# Patient Record
Sex: FEMALE | Race: BLACK OR AFRICAN AMERICAN | NOT HISPANIC OR LATINO
[De-identification: names, ages, dates, MRNs, and addresses within clinical notes are randomized per-mention and may not be internally consistent; named-entity substitution may affect disease eponyms.]

---

## 2017-04-04 ENCOUNTER — RECORD ABSTRACTING (OUTPATIENT)
Age: 32
End: 2017-04-04

## 2017-04-04 DIAGNOSIS — R87.610 ATYPICAL SQUAMOUS CELLS OF UNDETERMINED SIGNIFICANCE ON CYTOLOGIC SMEAR OF CERVIX (ASC-US): ICD-10-CM

## 2017-04-04 DIAGNOSIS — Z83.3 FAMILY HISTORY OF DIABETES MELLITUS: ICD-10-CM

## 2017-04-04 DIAGNOSIS — Z12.4 ENCOUNTER FOR SCREENING FOR MALIGNANT NEOPLASM OF CERVIX: ICD-10-CM

## 2017-05-10 ENCOUNTER — INPATIENT (INPATIENT)
Facility: HOSPITAL | Age: 32
LOS: 3 days | Discharge: HOME | End: 2017-05-14
Attending: OBSTETRICS & GYNECOLOGY | Admitting: OBSTETRICS & GYNECOLOGY

## 2017-06-28 DIAGNOSIS — O36.63X0 MATERNAL CARE FOR EXCESSIVE FETAL GROWTH, THIRD TRIMESTER, NOT APPLICABLE OR UNSPECIFIED: ICD-10-CM

## 2017-06-28 DIAGNOSIS — Z3A.38 38 WEEKS GESTATION OF PREGNANCY: ICD-10-CM

## 2018-12-28 ENCOUNTER — OUTPATIENT (OUTPATIENT)
Dept: OUTPATIENT SERVICES | Facility: HOSPITAL | Age: 33
LOS: 1 days | Discharge: HOME | End: 2018-12-28

## 2018-12-28 DIAGNOSIS — Z02.1 ENCOUNTER FOR PRE-EMPLOYMENT EXAMINATION: ICD-10-CM

## 2019-11-29 ENCOUNTER — OUTPATIENT (OUTPATIENT)
Dept: OUTPATIENT SERVICES | Facility: HOSPITAL | Age: 34
LOS: 1 days | Discharge: HOME | End: 2019-11-29
Payer: COMMERCIAL

## 2019-11-29 DIAGNOSIS — M54.9 DORSALGIA, UNSPECIFIED: ICD-10-CM

## 2019-11-29 PROCEDURE — 72070 X-RAY EXAM THORAC SPINE 2VWS: CPT | Mod: 26

## 2019-11-29 PROCEDURE — 72100 X-RAY EXAM L-S SPINE 2/3 VWS: CPT | Mod: 26

## 2019-12-19 ENCOUNTER — OUTPATIENT (OUTPATIENT)
Dept: OUTPATIENT SERVICES | Facility: HOSPITAL | Age: 34
LOS: 1 days | Discharge: HOME | End: 2019-12-19

## 2019-12-19 DIAGNOSIS — M54.89 OTHER DORSALGIA: ICD-10-CM

## 2020-06-18 ENCOUNTER — INPATIENT (INPATIENT)
Facility: HOSPITAL | Age: 35
LOS: 1 days | Discharge: HOME | End: 2020-06-20
Attending: INTERNAL MEDICINE | Admitting: INTERNAL MEDICINE
Payer: COMMERCIAL

## 2020-06-18 VITALS
RESPIRATION RATE: 18 BRPM | SYSTOLIC BLOOD PRESSURE: 104 MMHG | TEMPERATURE: 98 F | HEART RATE: 92 BPM | OXYGEN SATURATION: 99 % | DIASTOLIC BLOOD PRESSURE: 55 MMHG

## 2020-06-18 LAB
ALBUMIN SERPL ELPH-MCNC: 4 G/DL — SIGNIFICANT CHANGE UP (ref 3.5–5.2)
ALP SERPL-CCNC: 46 U/L — SIGNIFICANT CHANGE UP (ref 30–115)
ALT FLD-CCNC: 8 U/L — SIGNIFICANT CHANGE UP (ref 0–41)
ANION GAP SERPL CALC-SCNC: 12 MMOL/L — SIGNIFICANT CHANGE UP (ref 7–14)
APPEARANCE UR: CLEAR — SIGNIFICANT CHANGE UP
AST SERPL-CCNC: 12 U/L — SIGNIFICANT CHANGE UP (ref 0–41)
BASE EXCESS BLDV CALC-SCNC: 3.7 MMOL/L — HIGH (ref -2–2)
BASOPHILS # BLD AUTO: 0.03 K/UL — SIGNIFICANT CHANGE UP (ref 0–0.2)
BASOPHILS NFR BLD AUTO: 0.4 % — SIGNIFICANT CHANGE UP (ref 0–1)
BILIRUB SERPL-MCNC: <0.2 MG/DL — SIGNIFICANT CHANGE UP (ref 0.2–1.2)
BILIRUB UR-MCNC: NEGATIVE — SIGNIFICANT CHANGE UP
BLD GP AB SCN SERPL QL: SIGNIFICANT CHANGE UP
BUN SERPL-MCNC: 15 MG/DL — SIGNIFICANT CHANGE UP (ref 10–20)
CA-I SERPL-SCNC: 1.19 MMOL/L — SIGNIFICANT CHANGE UP (ref 1.12–1.3)
CALCIUM SERPL-MCNC: 8.9 MG/DL — SIGNIFICANT CHANGE UP (ref 8.5–10.1)
CHLORIDE SERPL-SCNC: 100 MMOL/L — SIGNIFICANT CHANGE UP (ref 98–110)
CO2 SERPL-SCNC: 27 MMOL/L — SIGNIFICANT CHANGE UP (ref 17–32)
COLOR SPEC: SIGNIFICANT CHANGE UP
CREAT SERPL-MCNC: 0.7 MG/DL — SIGNIFICANT CHANGE UP (ref 0.7–1.5)
DIFF PNL FLD: NEGATIVE — SIGNIFICANT CHANGE UP
EOSINOPHIL # BLD AUTO: 0.17 K/UL — SIGNIFICANT CHANGE UP (ref 0–0.7)
EOSINOPHIL NFR BLD AUTO: 2.5 % — SIGNIFICANT CHANGE UP (ref 0–8)
GAS PNL BLDV: 139 MMOL/L — SIGNIFICANT CHANGE UP (ref 136–145)
GAS PNL BLDV: SIGNIFICANT CHANGE UP
GLUCOSE SERPL-MCNC: 98 MG/DL — SIGNIFICANT CHANGE UP (ref 70–99)
GLUCOSE UR QL: NEGATIVE — SIGNIFICANT CHANGE UP
HCG SERPL QL: NEGATIVE — SIGNIFICANT CHANGE UP
HCO3 BLDV-SCNC: 29 MMOL/L — SIGNIFICANT CHANGE UP (ref 22–29)
HCT VFR BLD CALC: 32 % — LOW (ref 37–47)
HCT VFR BLDA CALC: 33.3 % — LOW (ref 34–44)
HGB BLD CALC-MCNC: 10.9 G/DL — LOW (ref 14–18)
HGB BLD-MCNC: 10.2 G/DL — LOW (ref 12–16)
IMM GRANULOCYTES NFR BLD AUTO: 0.3 % — SIGNIFICANT CHANGE UP (ref 0.1–0.3)
INR BLD: 1.18 RATIO — SIGNIFICANT CHANGE UP (ref 0.65–1.3)
KETONES UR-MCNC: NEGATIVE — SIGNIFICANT CHANGE UP
LACTATE BLDV-MCNC: 0.7 MMOL/L — SIGNIFICANT CHANGE UP (ref 0.5–1.6)
LEUKOCYTE ESTERASE UR-ACNC: NEGATIVE — SIGNIFICANT CHANGE UP
LYMPHOCYTES # BLD AUTO: 2.25 K/UL — SIGNIFICANT CHANGE UP (ref 1.2–3.4)
LYMPHOCYTES # BLD AUTO: 32.5 % — SIGNIFICANT CHANGE UP (ref 20.5–51.1)
MCHC RBC-ENTMCNC: 30.3 PG — SIGNIFICANT CHANGE UP (ref 27–31)
MCHC RBC-ENTMCNC: 31.9 G/DL — LOW (ref 32–37)
MCV RBC AUTO: 95 FL — SIGNIFICANT CHANGE UP (ref 81–99)
MONOCYTES # BLD AUTO: 0.55 K/UL — SIGNIFICANT CHANGE UP (ref 0.1–0.6)
MONOCYTES NFR BLD AUTO: 7.9 % — SIGNIFICANT CHANGE UP (ref 1.7–9.3)
NEUTROPHILS # BLD AUTO: 3.91 K/UL — SIGNIFICANT CHANGE UP (ref 1.4–6.5)
NEUTROPHILS NFR BLD AUTO: 56.4 % — SIGNIFICANT CHANGE UP (ref 42.2–75.2)
NITRITE UR-MCNC: NEGATIVE — SIGNIFICANT CHANGE UP
NRBC # BLD: 0 /100 WBCS — SIGNIFICANT CHANGE UP (ref 0–0)
NT-PROBNP SERPL-SCNC: 24 PG/ML — SIGNIFICANT CHANGE UP (ref 0–300)
PCO2 BLDV: 48 MMHG — SIGNIFICANT CHANGE UP (ref 41–51)
PH BLDV: 7.39 — SIGNIFICANT CHANGE UP (ref 7.26–7.43)
PH UR: 6 — SIGNIFICANT CHANGE UP (ref 5–8)
PLATELET # BLD AUTO: 320 K/UL — SIGNIFICANT CHANGE UP (ref 130–400)
PO2 BLDV: 27 MMHG — SIGNIFICANT CHANGE UP (ref 20–40)
POTASSIUM BLDV-SCNC: 3.8 MMOL/L — SIGNIFICANT CHANGE UP (ref 3.3–5.6)
POTASSIUM SERPL-MCNC: 4.1 MMOL/L — SIGNIFICANT CHANGE UP (ref 3.5–5)
POTASSIUM SERPL-SCNC: 4.1 MMOL/L — SIGNIFICANT CHANGE UP (ref 3.5–5)
PROT SERPL-MCNC: 7 G/DL — SIGNIFICANT CHANGE UP (ref 6–8)
PROT UR-MCNC: SIGNIFICANT CHANGE UP
PROTHROM AB SERPL-ACNC: 13.6 SEC — HIGH (ref 9.95–12.87)
RBC # BLD: 3.37 M/UL — LOW (ref 4.2–5.4)
RBC # FLD: 12.4 % — SIGNIFICANT CHANGE UP (ref 11.5–14.5)
SAO2 % BLDV: 44 % — SIGNIFICANT CHANGE UP
SODIUM SERPL-SCNC: 139 MMOL/L — SIGNIFICANT CHANGE UP (ref 135–146)
SP GR SPEC: 1.03 — HIGH (ref 1.01–1.02)
TROPONIN T SERPL-MCNC: <0.01 NG/ML — SIGNIFICANT CHANGE UP
UROBILINOGEN FLD QL: SIGNIFICANT CHANGE UP
WBC # BLD: 6.93 K/UL — SIGNIFICANT CHANGE UP (ref 4.8–10.8)
WBC # FLD AUTO: 6.93 K/UL — SIGNIFICANT CHANGE UP (ref 4.8–10.8)

## 2020-06-18 PROCEDURE — 74177 CT ABD & PELVIS W/CONTRAST: CPT | Mod: 26

## 2020-06-18 PROCEDURE — 71275 CT ANGIOGRAPHY CHEST: CPT | Mod: 26

## 2020-06-18 PROCEDURE — 99284 EMERGENCY DEPT VISIT MOD MDM: CPT

## 2020-06-18 RX ORDER — AMPICILLIN SODIUM AND SULBACTAM SODIUM 250; 125 MG/ML; MG/ML
3 INJECTION, POWDER, FOR SUSPENSION INTRAMUSCULAR; INTRAVENOUS ONCE
Refills: 0 | Status: COMPLETED | OUTPATIENT
Start: 2020-06-18 | End: 2020-06-18

## 2020-06-18 RX ORDER — SODIUM CHLORIDE 9 MG/ML
1000 INJECTION INTRAMUSCULAR; INTRAVENOUS; SUBCUTANEOUS ONCE
Refills: 0 | Status: COMPLETED | OUTPATIENT
Start: 2020-06-18 | End: 2020-06-18

## 2020-06-18 RX ADMIN — SODIUM CHLORIDE 1000 MILLILITER(S): 9 INJECTION INTRAMUSCULAR; INTRAVENOUS; SUBCUTANEOUS at 18:26

## 2020-06-18 RX ADMIN — AMPICILLIN SODIUM AND SULBACTAM SODIUM 200 GRAM(S): 250; 125 INJECTION, POWDER, FOR SUSPENSION INTRAMUSCULAR; INTRAVENOUS at 18:27

## 2020-06-18 RX ADMIN — SODIUM CHLORIDE 1000 MILLILITER(S): 9 INJECTION INTRAMUSCULAR; INTRAVENOUS; SUBCUTANEOUS at 22:02

## 2020-06-18 NOTE — ED PROVIDER NOTE - PROGRESS NOTE DETAILS
ATTENDING NOTE:   34 y/o F here for eval of recent tummy tuck. Pt reported she had surgery on 6/3, recently flew back and was put on ABX at the time, which she finished a few days ago. Pt now reports pulling to L lower abdomen and drainage from her incisional wound. No f/c.   Agree with above exam.   Impression: Poorly healed wound, possible superimposed infection and wound dehiscence. Plan for CT labs, and surgery consult. received a call from Dr. Downey radiologist, ? embolism Right pulmonary artery, recommending CT angio chest STAT spoke to sx, will consult on case case s/o to Dr. Villalta pt with possible PE on CT abd pelvis. IVF bolus give. will re-image. surgery aware s/o Dr. Simmons F/u ct of chest. discuss with surgery f/u with surgery, not an ICU candidate at this time.

## 2020-06-18 NOTE — ED PROVIDER NOTE - GASTROINTESTINAL, MLM
Abdomen soft, (+) purulent d/c noted from umbilicus by incision, (+) induration and tenderness Left lower abdomen by incision, no guarding, no rebound.

## 2020-06-18 NOTE — ED ADULT TRIAGE NOTE - CHIEF COMPLAINT QUOTE
patient complains of post op complications. Pt had tummy tuck done 6/3. Has lower abdominal pain with drainage from site. Denies fever.

## 2020-06-18 NOTE — ED PROVIDER NOTE - CLINICAL SUMMARY MEDICAL DECISION MAKING FREE TEXT BOX
EY: Patient signed out to me by Dr. Choi, patient found to have PE with right heart strain, case discussed with ICU fellow, not a PERT/IR candidate at this time, patient not an icu candidate at this time, started on anticoagulation, patient denies sob

## 2020-06-18 NOTE — ED PROVIDER NOTE - OBJECTIVE STATEMENT
35 y.o. female without any PMH presented to the ER c/o d/c from surgical site for the past 2 days.  Pt states that she had a tummy tuck and breast lift in [place] on 6/3/2020 and was cleared to fly back on 6/9/2020.  Pt put on po Keflex which she finished 3 days ago.  Drainage coming from umbilicus.  Denies fever, chills, myalgias, nausea, vomiting, diarrhea.  States she feels a "pulling" to Left lower abdomen by sx site.  No other complaints.

## 2020-06-18 NOTE — ED ADULT NURSE REASSESSMENT NOTE - NS ED NURSE REASSESS COMMENT FT1
pt is aaox3, nad, respirations easy and regular, skin is warm, dry and normal in color, pt is RSR on cardiac monitor, pt is resting and comfortable, no complaints at this time

## 2020-06-19 DIAGNOSIS — Z98.891 HISTORY OF UTERINE SCAR FROM PREVIOUS SURGERY: Chronic | ICD-10-CM

## 2020-06-19 LAB
ALBUMIN SERPL ELPH-MCNC: 3.1 G/DL — LOW (ref 3.5–5.2)
ALP SERPL-CCNC: 37 U/L — SIGNIFICANT CHANGE UP (ref 30–115)
ALT FLD-CCNC: 6 U/L — SIGNIFICANT CHANGE UP (ref 0–41)
ANION GAP SERPL CALC-SCNC: 11 MMOL/L — SIGNIFICANT CHANGE UP (ref 7–14)
APTT BLD: 105.2 SEC — CRITICAL HIGH (ref 27–39.2)
APTT BLD: 179.7 SEC — CRITICAL HIGH (ref 27–39.2)
APTT BLD: 77.9 SEC — CRITICAL HIGH (ref 27–39.2)
AST SERPL-CCNC: 11 U/L — SIGNIFICANT CHANGE UP (ref 0–41)
BASOPHILS # BLD AUTO: 0.03 K/UL — SIGNIFICANT CHANGE UP (ref 0–0.2)
BASOPHILS NFR BLD AUTO: 0.5 % — SIGNIFICANT CHANGE UP (ref 0–1)
BILIRUB SERPL-MCNC: <0.2 MG/DL — SIGNIFICANT CHANGE UP (ref 0.2–1.2)
BUN SERPL-MCNC: 10 MG/DL — SIGNIFICANT CHANGE UP (ref 10–20)
CALCIUM SERPL-MCNC: 8.3 MG/DL — LOW (ref 8.5–10.1)
CHLORIDE SERPL-SCNC: 105 MMOL/L — SIGNIFICANT CHANGE UP (ref 98–110)
CO2 SERPL-SCNC: 25 MMOL/L — SIGNIFICANT CHANGE UP (ref 17–32)
CREAT SERPL-MCNC: 0.6 MG/DL — LOW (ref 0.7–1.5)
EOSINOPHIL # BLD AUTO: 0.14 K/UL — SIGNIFICANT CHANGE UP (ref 0–0.7)
EOSINOPHIL NFR BLD AUTO: 2.1 % — SIGNIFICANT CHANGE UP (ref 0–8)
GLUCOSE SERPL-MCNC: 97 MG/DL — SIGNIFICANT CHANGE UP (ref 70–99)
HCT VFR BLD CALC: 27.9 % — LOW (ref 37–47)
HGB BLD-MCNC: 9.1 G/DL — LOW (ref 12–16)
IMM GRANULOCYTES NFR BLD AUTO: 0.3 % — SIGNIFICANT CHANGE UP (ref 0.1–0.3)
LYMPHOCYTES # BLD AUTO: 2.57 K/UL — SIGNIFICANT CHANGE UP (ref 1.2–3.4)
LYMPHOCYTES # BLD AUTO: 38.9 % — SIGNIFICANT CHANGE UP (ref 20.5–51.1)
MAGNESIUM SERPL-MCNC: 1.8 MG/DL — SIGNIFICANT CHANGE UP (ref 1.8–2.4)
MCHC RBC-ENTMCNC: 31.6 PG — HIGH (ref 27–31)
MCHC RBC-ENTMCNC: 32.6 G/DL — SIGNIFICANT CHANGE UP (ref 32–37)
MCV RBC AUTO: 96.9 FL — SIGNIFICANT CHANGE UP (ref 81–99)
MONOCYTES # BLD AUTO: 0.59 K/UL — SIGNIFICANT CHANGE UP (ref 0.1–0.6)
MONOCYTES NFR BLD AUTO: 8.9 % — SIGNIFICANT CHANGE UP (ref 1.7–9.3)
NEUTROPHILS # BLD AUTO: 3.25 K/UL — SIGNIFICANT CHANGE UP (ref 1.4–6.5)
NEUTROPHILS NFR BLD AUTO: 49.3 % — SIGNIFICANT CHANGE UP (ref 42.2–75.2)
NRBC # BLD: 0 /100 WBCS — SIGNIFICANT CHANGE UP (ref 0–0)
PLATELET # BLD AUTO: 286 K/UL — SIGNIFICANT CHANGE UP (ref 130–400)
POTASSIUM SERPL-MCNC: 3.8 MMOL/L — SIGNIFICANT CHANGE UP (ref 3.5–5)
POTASSIUM SERPL-SCNC: 3.8 MMOL/L — SIGNIFICANT CHANGE UP (ref 3.5–5)
PROT SERPL-MCNC: 5.8 G/DL — LOW (ref 6–8)
RBC # BLD: 2.88 M/UL — LOW (ref 4.2–5.4)
RBC # FLD: 12.5 % — SIGNIFICANT CHANGE UP (ref 11.5–14.5)
SARS-COV-2 RNA SPEC QL NAA+PROBE: SIGNIFICANT CHANGE UP
SODIUM SERPL-SCNC: 141 MMOL/L — SIGNIFICANT CHANGE UP (ref 135–146)
TROPONIN T SERPL-MCNC: <0.01 NG/ML — SIGNIFICANT CHANGE UP
WBC # BLD: 6.6 K/UL — SIGNIFICANT CHANGE UP (ref 4.8–10.8)
WBC # FLD AUTO: 6.6 K/UL — SIGNIFICANT CHANGE UP (ref 4.8–10.8)

## 2020-06-19 PROCEDURE — 99223 1ST HOSP IP/OBS HIGH 75: CPT

## 2020-06-19 PROCEDURE — 99222 1ST HOSP IP/OBS MODERATE 55: CPT

## 2020-06-19 PROCEDURE — 93970 EXTREMITY STUDY: CPT | Mod: 26

## 2020-06-19 PROCEDURE — 93010 ELECTROCARDIOGRAM REPORT: CPT

## 2020-06-19 RX ORDER — ACETAMINOPHEN 500 MG
650 TABLET ORAL EVERY 6 HOURS
Refills: 0 | Status: DISCONTINUED | OUTPATIENT
Start: 2020-06-19 | End: 2020-06-20

## 2020-06-19 RX ORDER — HEPARIN SODIUM 5000 [USP'U]/ML
1500 INJECTION INTRAVENOUS; SUBCUTANEOUS
Qty: 25000 | Refills: 0 | Status: DISCONTINUED | OUTPATIENT
Start: 2020-06-19 | End: 2020-06-19

## 2020-06-19 RX ORDER — VANCOMYCIN HCL 1 G
1000 VIAL (EA) INTRAVENOUS EVERY 12 HOURS
Refills: 0 | Status: DISCONTINUED | OUTPATIENT
Start: 2020-06-19 | End: 2020-06-19

## 2020-06-19 RX ORDER — MUPIROCIN 20 MG/G
1 OINTMENT TOPICAL
Refills: 0 | Status: DISCONTINUED | OUTPATIENT
Start: 2020-06-19 | End: 2020-06-20

## 2020-06-19 RX ORDER — ENOXAPARIN SODIUM 100 MG/ML
90 INJECTION SUBCUTANEOUS EVERY 12 HOURS
Refills: 0 | Status: DISCONTINUED | OUTPATIENT
Start: 2020-06-19 | End: 2020-06-20

## 2020-06-19 RX ORDER — BACITRACIN ZINC 500 UNIT/G
1 OINTMENT IN PACKET (EA) TOPICAL
Refills: 0 | Status: DISCONTINUED | OUTPATIENT
Start: 2020-06-19 | End: 2020-06-20

## 2020-06-19 RX ORDER — SODIUM CHLORIDE 9 MG/ML
1000 INJECTION INTRAMUSCULAR; INTRAVENOUS; SUBCUTANEOUS
Refills: 0 | Status: DISCONTINUED | OUTPATIENT
Start: 2020-06-19 | End: 2020-06-20

## 2020-06-19 RX ORDER — SODIUM CHLORIDE 9 MG/ML
500 INJECTION INTRAMUSCULAR; INTRAVENOUS; SUBCUTANEOUS ONCE
Refills: 0 | Status: COMPLETED | OUTPATIENT
Start: 2020-06-19 | End: 2020-06-19

## 2020-06-19 RX ORDER — ENOXAPARIN SODIUM 100 MG/ML
90 INJECTION SUBCUTANEOUS ONCE
Refills: 0 | Status: DISCONTINUED | OUTPATIENT
Start: 2020-06-19 | End: 2020-06-19

## 2020-06-19 RX ORDER — BACITRACIN ZINC 500 UNIT/G
1 OINTMENT IN PACKET (EA) TOPICAL DAILY
Refills: 0 | Status: DISCONTINUED | OUTPATIENT
Start: 2020-06-19 | End: 2020-06-20

## 2020-06-19 RX ORDER — CEFAZOLIN SODIUM 1 G
2000 VIAL (EA) INJECTION EVERY 8 HOURS
Refills: 0 | Status: DISCONTINUED | OUTPATIENT
Start: 2020-06-19 | End: 2020-06-19

## 2020-06-19 RX ORDER — CHLORHEXIDINE GLUCONATE 213 G/1000ML
1 SOLUTION TOPICAL
Refills: 0 | Status: DISCONTINUED | OUTPATIENT
Start: 2020-06-19 | End: 2020-06-20

## 2020-06-19 RX ORDER — VANCOMYCIN HCL 1 G
1250 VIAL (EA) INTRAVENOUS EVERY 12 HOURS
Refills: 0 | Status: DISCONTINUED | OUTPATIENT
Start: 2020-06-19 | End: 2020-06-20

## 2020-06-19 RX ADMIN — Medication 166.67 MILLIGRAM(S): at 06:33

## 2020-06-19 RX ADMIN — Medication 650 MILLIGRAM(S): at 09:06

## 2020-06-19 RX ADMIN — MUPIROCIN 1 APPLICATION(S): 20 OINTMENT TOPICAL at 06:33

## 2020-06-19 RX ADMIN — HEPARIN SODIUM 13 UNIT(S)/HR: 5000 INJECTION INTRAVENOUS; SUBCUTANEOUS at 13:46

## 2020-06-19 RX ADMIN — Medication 1 APPLICATION(S): at 20:08

## 2020-06-19 RX ADMIN — SODIUM CHLORIDE 1500 MILLILITER(S): 9 INJECTION INTRAMUSCULAR; INTRAVENOUS; SUBCUTANEOUS at 02:35

## 2020-06-19 RX ADMIN — SODIUM CHLORIDE 500 MILLILITER(S): 9 INJECTION INTRAMUSCULAR; INTRAVENOUS; SUBCUTANEOUS at 09:20

## 2020-06-19 RX ADMIN — SODIUM CHLORIDE 100 MILLILITER(S): 9 INJECTION INTRAMUSCULAR; INTRAVENOUS; SUBCUTANEOUS at 18:22

## 2020-06-19 RX ADMIN — Medication 166.67 MILLIGRAM(S): at 18:15

## 2020-06-19 RX ADMIN — Medication 1 APPLICATION(S): at 18:21

## 2020-06-19 RX ADMIN — ENOXAPARIN SODIUM 90 MILLIGRAM(S): 100 INJECTION SUBCUTANEOUS at 20:02

## 2020-06-19 RX ADMIN — HEPARIN SODIUM 13 UNIT(S)/HR: 5000 INJECTION INTRAVENOUS; SUBCUTANEOUS at 18:22

## 2020-06-19 RX ADMIN — MUPIROCIN 1 APPLICATION(S): 20 OINTMENT TOPICAL at 18:21

## 2020-06-19 RX ADMIN — HEPARIN SODIUM 15 UNIT(S)/HR: 5000 INJECTION INTRAVENOUS; SUBCUTANEOUS at 09:07

## 2020-06-19 RX ADMIN — HEPARIN SODIUM 15 UNIT(S)/HR: 5000 INJECTION INTRAVENOUS; SUBCUTANEOUS at 02:35

## 2020-06-19 NOTE — ED ADULT NURSE REASSESSMENT NOTE - NS ED NURSE REASSESS COMMENT FT1
MD Velasquez  made aware PTT reading 77.9 4 hours after initiation of heparin infusion @ 1500uts/hour. As per MD patient will remain on heparin at this rate and will reassess at 11 when scheduled PTT results return .

## 2020-06-19 NOTE — CONSULT NOTE ADULT - ASSESSMENT
35F s/p breast reduction and lift with abdominoplasty on 6/3/20 in [place], [place] (Dr. Segundo) who presents with 2 days of purulent drainage from umbilical wound found to have fluid pockets in anterior abdominal wall of the lower abdomen w/o evidence of a discrete collection as well as a RLL pulmonary embolism.     PLAN:   - recommend antibiotics for umbilical wound  - no current closure planned  - therapeutic AC for PE 35F s/p breast reduction and lift with abdominoplasty on 6/3/20 in [place], [place] (Dr. Segundo) who presents with 2 days of purulent drainage from umbilical wound found to have fluid pockets in anterior abdominal wall of the lower abdomen w/o evidence of a discrete collection as well as a RLL pulmonary embolism.     PLAN:   - recommend antibiotics for umbilical wound  - no current closure planned  - therapeutic AC for PE    Senior Resident Note  Pt seen and examined  Above note has been reviewed and edited  35F s/p breast reduction and lift and tummy tuck in [place] on June 3rd now presenting with 2 days of drainage from her umbilical wound and a PE. Patient requires therapuetic anticoagulation and there is no discrete collection of fluid to drain. No surgical intervention for now  Plan d/w patient and Dr Anant Arellano

## 2020-06-19 NOTE — CONSULT NOTE ADULT - SUBJECTIVE AND OBJECTIVE BOX
Patient is a 35y old  Female who presents with a chief complaint of Surgical site infection, PE (2020 09:33)      HPI:  Pt is a 34 yo F with no significant PMH comes to the ED with complaint of surgical site infection.   Pt had a recent surgery in [place] on 6/3/2020 when she underwent- >breast reduction and lift with abdominoplasty.   Pt completed 7 day course of po abx post surgery. Now comes to the ED with c/o 2 days of purulent drainage from umbilical wound. No fevers, chills, no reported trauma. Patient denied any OWENS, cough, chest oain, SOB at rest, LE swelling, personal or family history of clotting disorder.  VS on arrival noted for /55. CT abdomen showed no discrete abdominal wall collection. Incident PE was noted on CT and then CTA chest was done showing Right lower lobar pulmonary embolism with evidence of right heart strain. Pt denies any SOB, chest pain, Respiratory symptoms.   Pt started on Heparin.   Seen by surgery- no planned intervention for surgical site infection. (2020 00:42)      PAST MEDICAL & SURGICAL HISTORY:  H/O:       SOCIAL HX:   Smoking                         ETOH                            Other    FAMILY HISTORY:  .  No cardiovascular or pulmonary family history     Review of System:  See HPI    Allergies    No Known Allergies    Intolerances          PHYSICAL EXAM  Vital Signs Last 24 Hrs  T(C): 36.8 (2020 10:45), Max: 37.5 (2020 09:09)  T(F): 98.3 (2020 10:45), Max: 99.5 (2020 09:09)  HR: 63 (2020 10:45) (60 - 92)  BP: 91/63 (2020 10:45) (82/53 - 104/55)  BP(mean): --  RR: 18 (2020 10:45) (18 - 18)  SpO2: 95% (2020 10:45) (95% - 99%)    General: In NAD  HEENT: DOE             Lymphatic system: No cervical LN     Lungs: Sorin BS  Cardiovascular: Regular  Gastrointestinal: Soft.  + BS   Musculoskeletal: No Clubbing.  Full range of motion.. Moves all extremities  Skin: Warm.  Intact  Neurological: No motor or sensory deficit       LABS:                          9.1    6.60  )-----------( 286      ( 2020 06:19 )             27.9                                               06-19    141  |  105  |  10  ----------------------------<  97  3.8   |  25  |  0.6<L>    Ca    8.3<L>      2020 06:19  Mg     1.8         TPro  5.8<L>  /  Alb  3.1<L>  /  TBili  <0.2  /  DBili  x   /  AST  11  /  ALT  6   /  AlkPhos  37        PT/INR - ( 2020 18:41 )   PT: 13.60 sec;   INR: 1.18 ratio         PTT - ( 2020 06:19 )  PTT:77.9 sec                                       Urinalysis Basic - ( 2020 22:00 )    Color: Light Yellow / Appearance: Clear / S.031 / pH: x  Gluc: x / Ketone: Negative  / Bili: Negative / Urobili: <2 mg/dL   Blood: x / Protein: Trace / Nitrite: Negative   Leuk Esterase: Negative / RBC: x / WBC x   Sq Epi: x / Non Sq Epi: x / Bacteria: x        CARDIAC MARKERS ( 2020 06:19 )  x     / <0.01 ng/mL / x     / x     / x      CARDIAC MARKERS ( 2020 22:00 )  x     / <0.01 ng/mL / x     / x     / x                                                LIVER FUNCTIONS - ( 2020 06:19 )  Alb: 3.1 g/dL / Pro: 5.8 g/dL / ALK PHOS: 37 U/L / ALT: 6 U/L / AST: 11 U/L / GGT: x                                                                                                MEDICATIONS  (STANDING):  BACItracin   Ointment 1 Application(s) Topical two times a day  chlorhexidine 4% Liquid 1 Application(s) Topical <User Schedule>  heparin  Infusion 1500 Unit(s)/Hr (15 mL/Hr) IV Continuous <Continuous>  mupirocin 2% Ointment 1 Application(s) Topical two times a day  vancomycin  IVPB 1250 milliGRAM(s) IV Intermittent every 12 hours    MEDICATIONS  (PRN):  acetaminophen   Tablet .. 650 milliGRAM(s) Oral every 6 hours PRN Moderate Pain (4 - 6)

## 2020-06-19 NOTE — H&P ADULT - HISTORY OF PRESENT ILLNESS
Pt is a 36 yo F with no significant PMH  s/p breast reduction and lift with abdominoplasty on 6/3/20 in [place], [place] (Dr. Segundo) who presents with 2 days of purulent drainage from umbilical wound. Pt reports no inciting factors. CT A/P also remarkable for PE. Pt denies any chest pain or dyspnea. Pt is a 36 yo F with no significant PMH comes to the ED with complaint of surgical site infection.   Pt had a recent surgery in [place] on 6/3/2020 when she underwent- >breast reduction and lift with abdominoplasty.   Pt completed 7 day course of po abx post surgery. Now comes to the ED with c/o 2 days of purulent drainage from umbilical wound. No fevers, chills, no reported trauma.   VS on arrival noted for /55. CT abdomen showed no discrete abdominal wall collection. Incident PE was noted on CT and then CTA chest was done showing Right lower lobar pulmonary embolism with evidence of right heart strain. Pt denies any SOB, chest pain, Respiratory symptoms.   Pt started on Heparin.   Seen by surgery- no planned intervention for surgical site infection.

## 2020-06-19 NOTE — CONSULT NOTE ADULT - ATTENDING COMMENTS
Pt seen and examined at bedside with SARABJIT Chew    36 yo F s/p BBR and abdominoplasty on 6/3/20 in [place] (Dr. Segundo), who Pt seen and examined at bedside with PRS Naina    36 yo F s/p BBR and abdominoplasty on 6/3/20 in [place] (Dr. Segundo), who reports 2 days of drainage from abdominoplasty incision near mons.  Drainage was described as "clear yellow with red stain" with no foul odor.   She also reported similar finding from umbilicus.  She had concern regarding umbilical wound is open.  She denies fevers, chills, SOB, CP, abdominal pain, abdominal redness.  This report is differs from prior history given elsewhere.  She reports compliance with surgical bra and abdominal binder until it was removed by ED staff.    On CT abd/pelvis study, demonstrated lobar PE for which she is admitted to medicine for AC theraphy--Heparin drip    Plastic Surgery was consulted for wound drainage, r/o post-op infection.    AVSS  Gen: NAD, AAOx3, non-toxic appearing.  Able to speak in full sentences  Breasts: BL breasts with wise-pattern incision, healing well, no palpable fluid BL, no erythema  Abd: abd flap warm and well perfused, insensate, no tenderness on palpation, +periumbilical fluid wave BL, no overlying erythema, no drainage from incisions at mons or umbilicus.  There is small wound dehiscence at right umbilicus with granulating fat.  No palpable hernia.    CT Abd/Pelvis reviewed:    1. Intraluminal filling defect is seen within the partially imaged right lower lobe pulmonary artery consistent with a pulmonary embolism. (5/1-15) A CTA study of the chest is recommended to further evaluate the pulmonary arteries.     2. Postsurgical changes are noted in the anterior abdominal wall of the lower abdomen. This includes stranding within the subcutaneous fatty tissues and small fluid pockets. Infected fluid cannot be excluded radiographically. There is no evidence of a discrete percutaneously drainable abdominal wall collection with gas and fluid.     3. The umbilical structures and periumbilical fat extends deep through the region of the linea alba, in the midline, between the rectus muscles, indenting the surface of the peritoneal cavity anteriorly.    WBC WNL, no left shift  UA negative    A/P: POD#14 s/p BBR and abdominoplasty (with likely rectus diastasis repair), likely post-op seroma of abdominoplasty    No acute surgical intervention.    -c/w AC for PE per medicine  -c/w IV abx for now given different historical acct of abdominal drainage  -recommend needle aspiration and culture of abdominal seroma. B/R/A explained in detail.  Pt gave verbal consent.  -bacitracin BID to umbilical wound and right lateral IMF  -c/w abdominal binder and surgical bra  -medical mgmt per medicine

## 2020-06-19 NOTE — CONSULT NOTE ADULT - SUBJECTIVE AND OBJECTIVE BOX
LAUREN FLORES [id number]  35y Female  1d    HPI:  35F s/p breast reduction and lift with abdominoplasty on 6/3/20 in [place], [place] (Dr. Segundo) who presents with 2 days of purulent drainage from umbilical wound. Pt reports no inciting factors. CT A/P also remarkable for PE. Pt denies any chest pain or dyspnea.     PAST MEDICAL & SURGICAL HISTORY:  breast reduction and lift with abdominoplasty, 6/3/20    Allergies  No Known Allergies    REVIEW OF SYSTEMS  [X] A ten-point review of systems was otherwise negative except as noted.  [ ] Due to altered mental status/intubation, subjective information were not able to be obtained from the patient. History was obtained, to the extent possible, from review of the chart and collateral sources of information.    Vital Signs Last 24 Hrs  T(C): 36.8 (2020 18:00), Max: 36.8 (2020 18:00)  T(F): 98.3 (2020 18:00), Max: 98.3 (2020 18:00)  HR: 92 (2020 18:00) (92 - 92)  BP: 104/55 (2020 18:00) (104/55 - 104/55)  RR: 18 (2020 18:00) (18 - 18)  SpO2: 99% (2020 18:00) (99% - 99%)      PHYSICAL EXAM:  GENERAL: NAD, well-appearing  CHEST/LUNG: Clear to auscultation bilaterally  HEART: Regular rate and rhythm  ABDOMEN: Soft, Nontender, Nondistended, umbilical wound with mild induration along superior right aspect  EXTREMITIES: No clubbing, cyanosis, or edema      LABS:                    10.2   6.93  )-----------( 320      ( 2020 18:41 )             32.0       Auto Neutrophil %: 56.4 % (20 @ 18:41)  Auto Immature Granulocyte %: 0.3 % (20 @ 18:41)        139  |  100  |  15  ----------------------------<  98  4.1   |  27  |  0.7    Calcium, Total Serum: 8.9 mg/dL (20 @ 18:41)    LFTs:             7.0  | <0.2 | 12       ------------------[46      ( 2020 18:41 )  4.0  | x    | 8           Blood Gas Venous - Lactate: 0.7 mmoL/L (20 @ 18:28)    Coags:     13.60  ----< 1.18    ( 2020 18:41 )     x         CARDIAC MARKERS ( 2020 22:00 )  x     / <0.01 ng/mL / x     / x     / x        Serum Pro-Brain Natriuretic Peptide: 24 pg/mL (20 @ 22:55)    Urinalysis Basic - ( 2020 22:00 )    Color: Light Yellow / Appearance: Clear / S.031 / pH: x  Gluc: x / Ketone: Negative  / Bili: Negative / Urobili: <2 mg/dL   Blood: x / Protein: Trace / Nitrite: Negative   Leuk Esterase: Negative / RBC: x / WBC x   Sq Epi: x / Non Sq Epi: x / Bacteria: x      RADIOLOGY & ADDITIONAL STUDIES:  < from: CT Angio Chest w/ IV Cont (20 @ 22:43) >  FINDINGS:    PULMONARY EMBOLUS: Right lower lobar pulmonary embolism extending into segmental branches. Evidence of right heart strain with RV:LV ratio of 1.0.    LUNGS, PLEURA, AIRWAYS: The central tracheobronchial tree is patent. Nopulmonary consolidation or mass. No pneumothorax or pleural effusion. No bronchiectasis or honeycombing.    THORACIC NODES: No axillary, mediastinal, or hilar lymphadenopathy.    MEDIASTINUM/GREAT VESSELS: No pericardial effusion. The thoracic aorta and main pulmonary trunk are normal in caliber.    BONES/SOFT TISSUES: No acute osseous abnormality.    Please see separate dictated report for findings of the abdomen.      IMPRESSION:  Right lower lobar pulmonary embolism with evidence of right heart strain.        < from: CT Abdomen and Pelvis w/ IV Cont (20 @ 20:39) >  FINDINGS:  LOWER CHEST: Intraluminal filling defect is seen within the partially imaged  right lower lobe pulmonary artery consistent with a pulmonary embolism. (5/1-15) A CTA study of the chest is recommended to further evaluate the pulmonary arteries. The lung bases are clear. No pleural or pericardial effusion. Small fluid collections are noted inferolateral aspect of the right and left breast.    HEPATIC: The liver is normal in appearance with no evidence of mass or bile duct dilatation.      BILIARY: No calcified gallstones are noted.  SPLEEN: Unremarkable.        PANCREAS: The pancreas is normal in size and configuration. No evidence of mass or pancreatitis.     ADRENAL GLANDS: Unremarkable.        KIDNEYS: There is symmetric bilateral renal enhancement. No evidence of hydronephrosis, calcified stones, or solid mass.     ABDOMINOPELVIC NODES: Unremarkable.    PELVIC ORGANS: An IUD is noted. No evidence of pelvic mass, lymphadenopathy, or fluid collection.    PERITONEUM/MESENTERY/BOWEL: Status post gastric surgery. No evidence of bowel obstruction, colitis, inflammatory process, or ascites within the abdomen or pelvis. The appendix is normal in appearance.    BONES/SOFT TISSUES: Postsurgical changes are noted in the anterior abdominal wall of the lower abdomen. This includes stranding within the subcutaneous fatty tissues and small fluid pockets. Infected fluid cannot be excluded radiographically. There is no evidence of a discrete percutaneously drainable abdominal wall collection with gas and fluid. The umbilical structures and periumbilical fat extends deep through the region of the linea alba, in the midline, between the rectus muscles, indenting the surface of the peritoneal cavity anteriorly.    OTHER: No intra-abdominal vascular abnormalities.      IMPRESSION:   1. Intraluminal filling defect is seen within the partially imaged right lower lobe pulmonary artery consistent with a pulmonary embolism. (5/1-15) A CTA study of the chest isrecommended to further evaluate the pulmonary arteries.   2. Postsurgical changes are noted in the anterior abdominal wall of the lower abdomen. This includes stranding within the subcutaneous fatty tissues and small fluid pockets. Infected fluid cannot be excluded radiographically. There is no evidence of a discrete percutaneously drainable abdominal wall collection with gas and fluid.   3. The umbilical structures and periumbilical fat extends deep through the region of the linea alba, in the midline, between the rectus muscles, indenting the surface of the peritoneal cavity anteriorly.

## 2020-06-19 NOTE — H&P ADULT - ASSESSMENT
36 yo F with no significant PMH comes to the ED with complaint of surgical site infection.     #) Unprovoked SubMassive PE  - + R heart strain on CTA chest  - BP on lower side, Sat 98 on room air  - s/p 2L IVF in the ED- BP 90s/50s- 500cc IVF bolus  - Trop -ve x 1, BNP normal  - f/u echo, Venous duplex  - Start on heparin, monitor ptt- DOAC on DC  - Will speak to ICU for HD monitoring  - Will need outpt hypercoagulable workup.     #) Surgical site infection  - sepsis ruled out on infection  - surgery following- f/u recs  - start on cefazolin, f/u MRSA  - ID eval  - f/u blood cx    #) diet- regular  #) dvt ppx- on heparin  #)gi ppx- not needed  #) dispo- acute  #) ambulate as tolerated. 34 yo F with no significant PMH comes to the ED with complaint of surgical site infection. found to have PE with heart strain.     #) provoked? ( post surgery) SubMassive PE  - + R heart strain on CTA chest  - BP on lower side- clinically not hypoperfusing, Sat 98 on room air- monitor for pulse ox- supplemental oxygen as needed.   - s/p 2L IVF in the ED- BP 90s/50s- 500cc IVF bolus  - Trop -ve x 1, BNP normal, f/u trop in AM.   - f/u echo, Venous duplex  - Start on heparin, monitor ptt- DOAC on DC  - Will need outpt hypercoagulable workup.     #) Surgical site infection  - sepsis ruled out on infection  - surgery following- f/u recs  - start on vanc for now, f/u MRSA- de-escalate as needed.  - ID eval  - f/u blood cx    #) diet- regular  #) dvt ppx- on heparin  #)gi ppx- not needed  #) dispo- acute  #) ambulate as tolerated.

## 2020-06-19 NOTE — H&P ADULT - ATTENDING COMMENTS
HPI:  Pt is a 34 yo F with no significant PMH comes to the ED with complaint of surgical site infection.   Pt had a recent surgery in [place] on 6/3/2020 when she underwent- >breast reduction and lift with abdominoplasty.   Pt completed 7 day course of po abx post surgery. Now comes to the ED with c/o 2 days of purulent drainage from umbilical wound. No fevers, chills, no reported trauma.   VS on arrival noted for /55. CT abdomen showed no discrete abdominal wall collection. Incident PE was noted on CT and then CTA chest was done showing Right lower lobar pulmonary embolism with evidence of right heart strain. Pt denies any SOB, chest pain, Respiratory symptoms.   Pt started on Heparin.   Seen by surgery- no planned intervention for surgical site infection. (19 Jun 2020 00:42)    REVIEW OF SYSTEMS: see cc/HPI  CONSTITUTIONAL: No weakness, fevers or chills  EYES/ENT: No visual changes;  No vertigo or throat pain   NECK: No pain or stiffness  RESPIRATORY: No cough, wheezing, hemoptysis; No shortness of breath  CARDIOVASCULAR: No chest pain or palpitations  GASTROINTESTINAL: No abdominal or epigastric pain. No nausea, vomiting, or hematemesis; No diarrhea or constipation. No melena or hematochezia.  GENITOURINARY: No dysuria, frequency or hematuria  NEUROLOGICAL: No numbness or weakness  SKIN: No itching, rashes    Physical Exam:  General: WN/WD NAD  Neurology: A&Ox3, nonfocal, follows commands  Eyes: PERRLA/ EOMI  ENT/Neck: Neck supple, trachea midline, No JVD  Respiratory: CTA B/L, No wheezing, rales, rhonchi  CV: Normal rate regular rhythm, S1S2, no murmurs, rubs or gallops  Abdominal: Soft, ND +BS, suture line tenderness and induration, (+) umbilical discharge - pus  Extremities: No edema, + peripheral pulses  Skin: No Rashes, Hematoma, Ecchymosis  Incisions: n/a  Tubes: n/a  A/p  Acute PE (provoked - s/p surgery and COVID-19 infection) w/ R heart strain - not hypoxic and no acute hemodynamic instability ( not an PERC/ ICU candidate )  -2D echo and serial Trop   -agree w/ venous duplex  -agree - can have hypercoag w/u as an outpatient   -Pulm eval     Surgical site infection   -IV abx   -Surgical follow up  -ID eval   -repeat CBC and check blood cultures    DVT prophylaxis

## 2020-06-19 NOTE — CONSULT NOTE ADULT - SUBJECTIVE AND OBJECTIVE BOX
LAUREN FLORES  35y, Female  Allergy: No Known Allergies      All historical available data reviewed.    HPI:  Pt is a 36 yo F with no significant PMH comes to the ED with drainage from surgical umbilical site  Pt had a recent surgery in [place] on 6/3/2020 when she underwent- >breast reduction and lift with abdominoplasty.   Pt completed 7 day course of po Cephalexin post surgery. Now comes to the ED with c/o 2 days of cloudy drainage from umbilical wound. No fevers, chills, pain  no reported trauma.   CT abdomen showed no discrete abdominal wall collection. Incident PE was noted on CT and then CTA chest was done showing Right lower lobar pulmonary embolism with evidence of right heart strain. Pt denies any SOB, chest pain, Respiratory symptoms.   Pt started on Heparin.   Seen by surgery- no planned intervention for surgical site infection. (2020 00:42)  ID called ot evaluate site    FAMILY HISTORY:    PAST MEDICAL & SURGICAL HISTORY:  H/O:         VITALS:  T(F): 99.5, Max: 99.5 (20 @ 09:09)  HR: 63  BP: 82/53  RR: 18Vital Signs Last 24 Hrs  T(C): 37.5 (2020 09:09), Max: 37.5 (2020 09:09)  T(F): 99.5 (2020 09:09), Max: 99.5 (2020 09:09)  HR: 63 (2020 09:09) (63 - 92)  BP: 82/53 (2020 09:09) (82/53 - 104/55)  BP(mean): --  RR: 18 (2020 09:09) (18 - 18)  SpO2: 98% (2020 09:09) (98% - 99%)    TESTS & MEASUREMENTS:                        9.1    6.60  )-----------( 286      ( 2020 06:19 )             27.9     -    141  |  105  |  10  ----------------------------<  97  3.8   |  25  |  0.6<L>    Ca    8.3<L>      2020 06:19  Mg     1.8     -19    TPro  5.8<L>  /  Alb  3.1<L>  /  TBili  <0.2  /  DBili  x   /  AST  11  /  ALT  6   /  AlkPhos  37  06-19    LIVER FUNCTIONS - ( 2020 06:19 )  Alb: 3.1 g/dL / Pro: 5.8 g/dL / ALK PHOS: 37 U/L / ALT: 6 U/L / AST: 11 U/L / GGT: x             Urinalysis Basic - ( 2020 22:00 )    Color: Light Yellow / Appearance: Clear / S.031 / pH: x  Gluc: x / Ketone: Negative  / Bili: Negative / Urobili: <2 mg/dL   Blood: x / Protein: Trace / Nitrite: Negative   Leuk Esterase: Negative / RBC: x / WBC x   Sq Epi: x / Non Sq Epi: x / Bacteria: x          RADIOLOGY & ADDITIONAL TESTS:  Personal review of radiological diagnostics performed  Echo and EKG results noted when applicable.     MEDICATIONS:  acetaminophen   Tablet .. 650 milliGRAM(s) Oral every 6 hours PRN  chlorhexidine 4% Liquid 1 Application(s) Topical <User Schedule>  heparin  Infusion 1500 Unit(s)/Hr IV Continuous <Continuous>  mupirocin 2% Ointment 1 Application(s) Topical two times a day  vancomycin  IVPB 1250 milliGRAM(s) IV Intermittent every 12 hours      ANTIBIOTICS:  vancomycin  IVPB 1250 milliGRAM(s) IV Intermittent every 12 hours

## 2020-06-19 NOTE — CONSULT NOTE ADULT - ASSESSMENT
Impression    PE in the RLL likely 2ry to recent surgery  RV strain on CT(-ve tn, -ve probnp, hemodynamically stable)  Abdominal seroma/abscess?    Plan:    Check official ECHO  Check LE duplex  Anticoagulation for at least 3 months.  Hypercoagulable workup as outpt.  Outpt f/u in 6 weeks  OOB to chair

## 2020-06-19 NOTE — H&P ADULT - NSHPLABSRESULTS_GEN_ALL_CORE
10.2   6.93  )-----------( 320      ( 2020 18:41 )             32.0       06-18    139  |  100  |  15  ----------------------------<  98  4.1   |  27  |  0.7    Ca    8.9      2020 18:41    TPro  7.0  /  Alb  4.0  /  TBili  <0.2  /  DBili  x   /  AST  12  /  ALT  8   /  AlkPhos  46  18              Urinalysis Basic - ( 2020 22:00 )    Color: Light Yellow / Appearance: Clear / S.031 / pH: x  Gluc: x / Ketone: Negative  / Bili: Negative / Urobili: <2 mg/dL   Blood: x / Protein: Trace / Nitrite: Negative   Leuk Esterase: Negative / RBC: x / WBC x   Sq Epi: x / Non Sq Epi: x / Bacteria: x        PT/INR - ( 2020 18:41 )   PT: 13.60 sec;   INR: 1.18 ratio             Lactate Trend      CARDIAC MARKERS ( 2020 22:00 )  x     / <0.01 ng/mL / x     / x     / x          < from: CT Angio Chest w/ IV Cont (20 @ 22:43) >    IMPRESSION:    Right lower lobar pulmonary embolism with evidence of right heart strain.    < end of copied text >    < from: CT Abdomen and Pelvis w/ IV Cont (20 @ 20:39) >    IMPRESSION:     1. Intraluminal filling defect is seen within the partially imaged right lower lobe pulmonary artery consistent with a pulmonary embolism. (5/1-15) A CTA study of the chest isrecommended to further evaluate the pulmonary arteries.     2. Postsurgical changes are noted in the anterior abdominal wall of the lower abdomen. This includes stranding within the subcutaneous fatty tissues and small fluid pockets. Infected fluid cannot be excluded radiographically. There is no evidence of a discrete percutaneously drainable abdominal wall collection with gas and fluid.     3. The umbilical structures and periumbilical fat extends deep through the region of the linea alba, in the midline, between the rectus muscles, indenting the surface of the peritoneal cavity anteriorly.    < end of copied text >

## 2020-06-19 NOTE — CONSULT NOTE ADULT - ASSESSMENT
34 yo F s/p BBR and abdominoplasty on 6/3/20 in [place] (Dr. Segundo), who reports 2 days of drainage from umbilicus.  Drainage with no foul odor.    CT chest ; RLL PE    < from: CT Abdomen and Pelvis w/ IV Cont (06.18.20 @ 20:39) >   Postsurgical changes are noted in the anterior abdominal wall of the lower abdomen. This includes stranding within the subcutaneous fatty tissues and small fluid pockets. Infected fluid cannot be excluded radiographically. There is no evidence of a discrete percutaneously drainable abdominal wall collection with gas and fluid.   3. The umbilical structures and periumbilical fat extends deep through the region of the linea alba, in the midline, between the rectus muscles, indenting the surface of the peritoneal cavity anteriorly.    IMPRESSION;   Seroma post op periumbilical. No cellulitis. No abscess ( serous drainage, no pain, no fevers, WBC 6.6 ) CT with no drainable collection  no cellulitis  PE    RECOMMENDATIONS;   Diagnostic aspirate of seroma if IVR could do it  Doubt iv ABx required over po ABx unless a surgical debridement of site ( which I do not believe the pt needs )  po Augmentin 875 mg q12h and po Bactrim 2 DS q12h for 10 days  f/u with the plastic surgeon

## 2020-06-19 NOTE — CONSULT NOTE ADULT - GASTROINTESTINAL COMMENTS
minimnal serous, non purulent drainage from small umbilical wound. No surrounding erythema/induration/crepitans. Incision with no nfection

## 2020-06-20 ENCOUNTER — TRANSCRIPTION ENCOUNTER (OUTPATIENT)
Age: 35
End: 2020-06-20

## 2020-06-20 VITALS
RESPIRATION RATE: 18 BRPM | OXYGEN SATURATION: 99 % | DIASTOLIC BLOOD PRESSURE: 52 MMHG | SYSTOLIC BLOOD PRESSURE: 107 MMHG | HEART RATE: 87 BPM | TEMPERATURE: 97 F

## 2020-06-20 LAB
ANION GAP SERPL CALC-SCNC: 11 MMOL/L — SIGNIFICANT CHANGE UP (ref 7–14)
BASOPHILS # BLD AUTO: 0.02 K/UL — SIGNIFICANT CHANGE UP (ref 0–0.2)
BASOPHILS NFR BLD AUTO: 0.3 % — SIGNIFICANT CHANGE UP (ref 0–1)
BUN SERPL-MCNC: 6 MG/DL — LOW (ref 10–20)
CALCIUM SERPL-MCNC: 8.1 MG/DL — LOW (ref 8.5–10.1)
CHLORIDE SERPL-SCNC: 106 MMOL/L — SIGNIFICANT CHANGE UP (ref 98–110)
CO2 SERPL-SCNC: 23 MMOL/L — SIGNIFICANT CHANGE UP (ref 17–32)
CREAT SERPL-MCNC: 0.6 MG/DL — LOW (ref 0.7–1.5)
EOSINOPHIL # BLD AUTO: 0.13 K/UL — SIGNIFICANT CHANGE UP (ref 0–0.7)
EOSINOPHIL NFR BLD AUTO: 2 % — SIGNIFICANT CHANGE UP (ref 0–8)
GLUCOSE BLDC GLUCOMTR-MCNC: 105 MG/DL — HIGH (ref 70–99)
GLUCOSE SERPL-MCNC: 94 MG/DL — SIGNIFICANT CHANGE UP (ref 70–99)
GRAM STN FLD: SIGNIFICANT CHANGE UP
GRAM STN FLD: SIGNIFICANT CHANGE UP
HCT VFR BLD CALC: 27.6 % — LOW (ref 37–47)
HGB BLD-MCNC: 9.1 G/DL — LOW (ref 12–16)
IMM GRANULOCYTES NFR BLD AUTO: 0.3 % — SIGNIFICANT CHANGE UP (ref 0.1–0.3)
LYMPHOCYTES # BLD AUTO: 2.6 K/UL — SIGNIFICANT CHANGE UP (ref 1.2–3.4)
LYMPHOCYTES # BLD AUTO: 40.3 % — SIGNIFICANT CHANGE UP (ref 20.5–51.1)
MAGNESIUM SERPL-MCNC: 1.8 MG/DL — SIGNIFICANT CHANGE UP (ref 1.8–2.4)
MCHC RBC-ENTMCNC: 31.6 PG — HIGH (ref 27–31)
MCHC RBC-ENTMCNC: 33 G/DL — SIGNIFICANT CHANGE UP (ref 32–37)
MCV RBC AUTO: 95.8 FL — SIGNIFICANT CHANGE UP (ref 81–99)
METHOD TYPE: SIGNIFICANT CHANGE UP
MONOCYTES # BLD AUTO: 0.53 K/UL — SIGNIFICANT CHANGE UP (ref 0.1–0.6)
MONOCYTES NFR BLD AUTO: 8.2 % — SIGNIFICANT CHANGE UP (ref 1.7–9.3)
MRSA PCR RESULT.: NEGATIVE — SIGNIFICANT CHANGE UP
NEUTROPHILS # BLD AUTO: 3.15 K/UL — SIGNIFICANT CHANGE UP (ref 1.4–6.5)
NEUTROPHILS NFR BLD AUTO: 48.9 % — SIGNIFICANT CHANGE UP (ref 42.2–75.2)
NRBC # BLD: 0 /100 WBCS — SIGNIFICANT CHANGE UP (ref 0–0)
PLATELET # BLD AUTO: 275 K/UL — SIGNIFICANT CHANGE UP (ref 130–400)
POTASSIUM SERPL-MCNC: 4 MMOL/L — SIGNIFICANT CHANGE UP (ref 3.5–5)
POTASSIUM SERPL-SCNC: 4 MMOL/L — SIGNIFICANT CHANGE UP (ref 3.5–5)
RBC # BLD: 2.88 M/UL — LOW (ref 4.2–5.4)
RBC # FLD: 12.3 % — SIGNIFICANT CHANGE UP (ref 11.5–14.5)
SODIUM SERPL-SCNC: 140 MMOL/L — SIGNIFICANT CHANGE UP (ref 135–146)
SPECIMEN SOURCE: SIGNIFICANT CHANGE UP
SPECIMEN SOURCE: SIGNIFICANT CHANGE UP
WBC # BLD: 6.45 K/UL — SIGNIFICANT CHANGE UP (ref 4.8–10.8)
WBC # FLD AUTO: 6.45 K/UL — SIGNIFICANT CHANGE UP (ref 4.8–10.8)

## 2020-06-20 PROCEDURE — 93306 TTE W/DOPPLER COMPLETE: CPT | Mod: 26

## 2020-06-20 PROCEDURE — 99239 HOSP IP/OBS DSCHRG MGMT >30: CPT

## 2020-06-20 PROCEDURE — 99233 SBSQ HOSP IP/OBS HIGH 50: CPT

## 2020-06-20 RX ORDER — APIXABAN 2.5 MG/1
1 TABLET, FILM COATED ORAL
Qty: 120 | Refills: 0
Start: 2020-06-20 | End: 2020-08-18

## 2020-06-20 RX ORDER — APIXABAN 2.5 MG/1
1 TABLET, FILM COATED ORAL
Qty: 72 | Refills: 0
Start: 2020-06-20 | End: 2020-07-25

## 2020-06-20 RX ORDER — APIXABAN 2.5 MG/1
10 TABLET, FILM COATED ORAL EVERY 12 HOURS
Refills: 0 | Status: DISCONTINUED | OUTPATIENT
Start: 2020-06-20 | End: 2020-06-20

## 2020-06-20 RX ORDER — LANOLIN/MINERAL OIL
1 LOTION (ML) TOPICAL
Qty: 50 | Refills: 0
Start: 2020-06-20

## 2020-06-20 RX ORDER — BACITRACIN ZINC 500 UNIT/G
1 OINTMENT IN PACKET (EA) TOPICAL
Qty: 50 | Refills: 0
Start: 2020-06-20

## 2020-06-20 RX ORDER — APIXABAN 2.5 MG/1
1 TABLET, FILM COATED ORAL
Qty: 168 | Refills: 0
Start: 2020-06-20

## 2020-06-20 RX ADMIN — Medication 166.67 MILLIGRAM(S): at 06:06

## 2020-06-20 RX ADMIN — Medication 1 APPLICATION(S): at 11:29

## 2020-06-20 RX ADMIN — SODIUM CHLORIDE 100 MILLILITER(S): 9 INJECTION INTRAMUSCULAR; INTRAVENOUS; SUBCUTANEOUS at 03:13

## 2020-06-20 RX ADMIN — ENOXAPARIN SODIUM 90 MILLIGRAM(S): 100 INJECTION SUBCUTANEOUS at 06:06

## 2020-06-20 RX ADMIN — MUPIROCIN 1 APPLICATION(S): 20 OINTMENT TOPICAL at 06:07

## 2020-06-20 RX ADMIN — Medication 1 APPLICATION(S): at 06:06

## 2020-06-20 NOTE — DISCHARGE NOTE PROVIDER - HOSPITAL COURSE
HPI:    Pt is a 36 yo F with no significant PMH comes to the ED with complaint of surgical site infection.     Pt had a recent surgery in [place] on 6/3/2020 when she underwent- >breast reduction and lift with abdominoplasty.     Pt completed 7 day course of po abx post surgery. Now comes to the ED with c/o 2 days of purulent drainage from umbilical wound. No fevers, chills, no reported trauma.     VS on arrival noted for /55. CT abdomen showed no discrete abdominal wall collection.     Seen by surgery- no planned intervention for surgical site infection. Patient had I&D, and the gram stain was negative. Started on Vancomycin, will be discharged on Augmentin and Bactrim. Will follow up with surgery as outpatient and would dressing management        Incident PE was noted on CT and then CTA chest was done showing Right lower lobar pulmonary embolism with evidence of right heart strain. Pt denies any SOB, chest pain, Respiratory symptoms.     Pt started on Heparin and was then switched to eloquix which she will be taking 10mg BID for 7days and 5mg BID for the remaining 3 month course.

## 2020-06-20 NOTE — DISCHARGE NOTE PROVIDER - CARE PROVIDER_API CALL
Ankur Talamantes  CRITICAL CARE MEDICINE  [address] [facility] [address]  [place] [address]  Phone: [phone]  Fax: [phone]  Follow Up Time:     Marilia Ny)  [facility]urgical Physicians  [address] [facility], [address]  [place] [address]  Phone: [phone]  Fax: [phone]  Follow Up Time:     Dakota Bain  [id number]  [address] [place] [address]  Phone: [phone]  Fax: [phone]  Follow Up Time: Ankur Talamantes  CRITICAL CARE MEDICINE  [address] [facility] [address]  [place] [address]  Phone: [phone]  Fax: [phone]  Follow Up Time: 1 week    Marilia Ny)  [facility]  [address] [facility], [address]  [place] [address]  Phone: [phone]  Fax: [phone]  Follow Up Time: 1 week    Dakota Bain  [id number]  [address] [place] [address]  Phone: [phone]  Fax: [phone]  Follow Up Time: 1 week    Jason Barrera)  Hematology; Internal Medicine; Medical Oncology  [address] [place] [address]  Phone: [phone]  Fax: [phone]  Follow Up Time: 2 weeks

## 2020-06-20 NOTE — PROGRESS NOTE ADULT - SUBJECTIVE AND OBJECTIVE BOX
no chest pain   no sob   doing well     Vital Signs Last 24 Hrs  T(C): 36.1 (20 Jun 2020 12:46), Max: 36.9 (19 Jun 2020 13:45)  T(F): 97 (20 Jun 2020 12:46), Max: 98.5 (19 Jun 2020 13:45)  HR: 87 (20 Jun 2020 12:46) (67 - 87)  BP: 107/52 (20 Jun 2020 12:46) (86/53 - 107/52)  BP(mean): --  RR: 18 (20 Jun 2020 12:46) (18 - 18)  SpO2: 99% (20 Jun 2020 12:46) (97% - 99%)    PHYSICAL EXAM:  GENERAL: NAD, well-developed  HEAD:  Atraumatic, Normocephalic  EYES: EOMI, PERRLA, conjunctiva and sclera clear  NECK: Supple, No JVD  CHEST/LUNG: Clear to auscultation bilaterally; No wheeze  HEART: Regular rate and rhythm; No murmurs, rubs, or gallops  ABDOMEN: Soft, Nontender, Nondistended; Bowel sounds present.  EXTREMITIES:  2+ Peripheral Pulses, No clubbing, cyanosis, or edema  PSYCH: AAOx3  NEUROLOGY: non-focal  SKIN: No rashes or lesions                          9.1    6.45  )-----------( 275      ( 20 Jun 2020 04:30 )             27.6     06-20    140  |  106  |  6<L>  ----------------------------<  94  4.0   |  23  |  0.6<L>    Ca    8.1<L>      20 Jun 2020 04:30  Mg     1.8     06-20    TPro  5.8<L>  /  Alb  3.1<L>  /  TBili  <0.2  /  DBili  x   /  AST  11  /  ALT  6   /  AlkPhos  37  06-19    LIVER FUNCTIONS - ( 19 Jun 2020 06:19 )  Alb: 3.1 g/dL / Pro: 5.8 g/dL / ALK PHOS: 37 U/L / ALT: 6 U/L / AST: 11 U/L / GGT: x           PT/INR - ( 18 Jun 2020 18:41 )   PT: 13.60 sec;   INR: 1.18 ratio         PTT - ( 19 Jun 2020 21:24 )  PTT:105.2 sec  CARDIAC MARKERS ( 19 Jun 2020 06:19 )  x     / <0.01 ng/mL / x     / x     / x      CARDIAC MARKERS ( 18 Jun 2020 22:00 )  x     / <0.01 ng/mL / x     / x     / x            Culture - Body Fluid with Gram Stain (collected 19 Jun 2020 15:00)  Source: .Body Fluid Left periumbilical collection  Gram Stain (20 Jun 2020 02:57):    polymorphonuclear leukocytes seen    No organisms seen    by cytocentrifuge    Culture - Body Fluid with Gram Stain (collected 19 Jun 2020 15:00)  Source: .Body Fluid Right periumbilical collection  Gram Stain (20 Jun 2020 02:58):    polymorphonuclear leukocytes seen    No organisms seen    by cytocentrifuge    Culture - Blood (collected 18 Jun 2020 18:41)  Source: .Blood Blood  Preliminary Report (20 Jun 2020 01:01):    No growth to date.    Culture - Blood (collected 18 Jun 2020 18:41)  Source: .Blood Blood  Preliminary Report (20 Jun 2020 01:01):    No growth to date.    MEDICATIONS  (STANDING):  amoxicillin  875 milliGRAM(s)/clavulanate 1 Tablet(s) Oral two times a day  apixaban 10 milliGRAM(s) Oral every 12 hours  BACItracin   Ointment 1 Application(s) Topical two times a day  BACItracin   Ointment 1 Application(s) Topical daily  chlorhexidine 4% Liquid 1 Application(s) Topical <User Schedule>  mupirocin 2% Ointment 1 Application(s) Topical two times a day  trimethoprim  160 mG/sulfamethoxazole 800 mG 1 Tablet(s) Oral two times a day    MEDICATIONS  (PRN):  acetaminophen   Tablet .. 650 milliGRAM(s) Oral every 6 hours PRN Moderate Pain (4 - 6)

## 2020-06-20 NOTE — PROGRESS NOTE ADULT - ASSESSMENT
#acute pe likely provoked in the setting of recent surgery and recent travel   eliquis 10 BID for one week and then 5 BID   OPT hematology for hypercoag work up   patient was educated about possible side effects of bleeding with eliquis and given benefits outweigh risks patient agreed to be on eliquis   no dvt on LE duplex   echo pending read     #Seroma post op periumbilical. No cellulitis. No abscess ( serous drainage, no pain, no fevers,CT with no drainable collection  per ID po augmentin and bactrim for 10 days   dressing changes   OPT follow up with surgery     discharge today pending echo read   spent 35 min on discharge

## 2020-06-20 NOTE — DISCHARGE NOTE PROVIDER - PROVIDER TOKENS
PROVIDER:[TOKEN:[[id number]]],PROVIDER:[TOKEN:[[id number]]],PROVIDER:[TOKEN:[[id number]]] PROVIDER:[TOKEN:[[id number]],FOLLOWUP:[1 week]],PROVIDER:[TOKEN:[[id number]],FOLLOWUP:[1 week]],PROVIDER:[TOKEN:[[id number]],FOLLOWUP:[1 week]],PROVIDER:[TOKEN:[[id number]],FOLLOWUP:[2 weeks]]

## 2020-06-20 NOTE — PROGRESS NOTE ADULT - ASSESSMENT
A/P:  35F s/p breast reduction and lift with abdominoplasty on 6/3/20 in [place], [place] (Dr. Segundo) who presented to ED with 2 days of purulent drainage from umbilical wound, found to have fluid pockets in anterior abdominal wall of the lower abdomen w/o evidence of a discrete collection as well as a RLL pulmonary embolism. Patient underwent needle aspiration of umbilical collection and admitted for therapeutic anticoagulation for pulmonary embolism.     PLAN:   - Continue anticoagulation for PE per medicine x 3 months  - Follow up hypercoagulable workup, ECHO, LE duplex  - Continue IV abx  - Continue bacitracin BID to umbilical wound and right lateral IMF  - Continue with abdominal binder and surgical bra

## 2020-06-20 NOTE — DISCHARGE NOTE PROVIDER - NSDCCPCAREPLAN_GEN_ALL_CORE_FT
PRINCIPAL DISCHARGE DIAGNOSIS  Diagnosis: Pulmonary embolism  Assessment and Plan of Treatment: What is a pulmonary embolism?  Pulmonary embolism (or "PE") is a blockage in 1 or more of the blood vessels that supply blood to the lungs. Most often these blockages are caused by blood clots that form elsewhere and then travel to the lungs. In rare cases, blockages can also be caused by air bubbles, tiny globs of fat, or pieces of tumor that travel to the lungs.  Can I do anything on my own to prevent blood clots?  Yes. People sometimes form clots because they have been sitting still for too long. People who travel on long airplane flights, for example, are at increased risk of blood clots. Here are some things you can do to help prevent a clot during a long flight:  ?Stand up and walk around every 1 to 2 hours  ?Do not smoke just before your trip  ?Wear loose, comfortable clothes  ?Shift your position while seated, and move your legs and feet often  ?Wear knee-high compression stockings  ?Avoid alcohol and medicines that make you sleepy, because they can impair your ability to move around        SECONDARY DISCHARGE DIAGNOSES  Diagnosis: Surgical site infection  Assessment and Plan of Treatment: You had an infection after your surgery. We would like for you to take antibiotic, Augmentin and Bactrin as ordered to manage this infection. Follow up with surgery in 1 week.  -c/w bacitracin ointment BID to right breast and umbilical wound x 5 days then switch to aquaphor  -surgical bra and abdominal binder  -no heavy lifting  -ambulate frequently

## 2020-06-20 NOTE — DISCHARGE NOTE PROVIDER - NSDCMRMEDTOKEN_GEN_ALL_CORE_FT
Absorbase topical ointment: Apply topically to affected area 2 times a day   amoxicillin-clavulanate 875 mg-125 mg oral tablet: 1 tab(s) orally 2 times a day  apixaban 5 mg oral tablet: 2  tab(s) orally every 12 hours for 7 days then 1 tab twice a day for 11 weeks  apixaban 5 mg oral tablet: 1 tab(s) orally 2 times a day   bacitracin 500 units/g topical ointment: 1 application topically once a day  sulfamethoxazole-trimethoprim 800 mg-160 mg oral tablet: 1 tab(s) orally 2 times a day

## 2020-06-20 NOTE — PROGRESS NOTE ADULT - SUBJECTIVE AND OBJECTIVE BOX
GENERAL SURGERY PROGRESS NOTE     SANDRALAUREN  [address] [place] [address] day: 2d  Procedure: Umbilical wound drainage s/p abdominoplasty  Surgical Attending: Karen Courtney  Overnight events: No acute events overnight. Pain controlled.     T(F): 97 (20 @ 20:30), Max: 99.5 (20 @ 09:09)  HR: 73 (20 @ 20:30) (60 - 74)  BP: 97/55 (20 @ 20:30) (82/53 - 99/51)  RR: 18 (20 @ 20:30) (18 - 18)  SpO2: 98% (20 @ 19:41) (95% - 98%)    20 @ 07:01  -  20 @ 02:08  --------------------------------------------------------  IN:    sodium chloride 0.9%.: 100 mL  Total IN: 100 mL    OUT:  Total OUT: 0 mL    Total NET: 100 mL    DIET/FLUIDS: sodium chloride 0.9%. 1000 milliLiter(s) IV Continuous <Continuous>    ABx: vancomycin  IVPB 1250 milliGRAM(s) IV Intermittent every 12 hours    PHYSICAL EXAM:  GENERAL: NAD, well-appearing  CHEST/LUNG: Clear to auscultation bilaterally  HEART: Regular rate and rhythm  ABDOMEN: Soft, Nontender, Nondistended, umbilical wound s/p drainage  EXTREMITIES:  No clubbing, cyanosis, or edema    Labs:  CAPILLARY BLOOD GLUCOSE                        9.1    6.60  )-----------( 286      ( 2020 06:19 )             27.9       Auto Neutrophil %: 49.3 % (20 @ 06:19)  Auto Immature Granulocyte %: 0.3 % (20 @ 06:19)        141  |  105  |  10  ----------------------------<  97  3.8   |  25  |  0.6<L>      Calcium, Total Serum: 8.3 mg/dL (20 @ 06:19)      LFTs:             5.8  | <0.2 | 11       ------------------[37      ( 2020 06:19 )  3.1  | x    | 6           Lipase:x      Amylase:x         Blood Gas Venous - Lactate: 0.7 mmoL/L (20 @ 18:28)      Coags:     x      ----< x       ( 2020 21:24 )     105.2       CARDIAC MARKERS ( 2020 06:19 )  x     / <0.01 ng/mL / x     / x     / x      CARDIAC MARKERS ( 2020 22:00 )  x     / <0.01 ng/mL / x     / x     / x          Serum Pro-Brain Natriuretic Peptide: 24 pg/mL (20 @ 22:55)      Urinalysis Basic - ( 2020 22:00 )    Color: Light Yellow / Appearance: Clear / S.031 / pH: x  Gluc: x / Ketone: Negative  / Bili: Negative / Urobili: <2 mg/dL   Blood: x / Protein: Trace / Nitrite: Negative   Leuk Esterase: Negative / RBC: x / WBC x   Sq Epi: x / Non Sq Epi: x / Bacteria: x    Culture - Blood (collected 2020 18:41)  Source: .Blood Blood  Preliminary Report (2020 01:01):    No growth to date.    RADIOLOGY & ADDITIONAL TESTS:  < from: CT Angio Chest w/ IV Cont (20 @ 22:43) >  FINDINGS:    PULMONARY EMBOLUS: Right lower lobar pulmonary embolism extending into segmental branches. Evidence of right heart strain with RV:LV ratio of 1.0.    LUNGS, PLEURA, AIRWAYS: The central tracheobronchial tree is patent. Nopulmonary consolidation or mass. No pneumothorax or pleural effusion. No bronchiectasis or honeycombing.    THORACIC NODES: No axillary, mediastinal, or hilar lymphadenopathy.    MEDIASTINUM/GREAT VESSELS: No pericardial effusion. The thoracic aorta and main pulmonary trunk are normal in caliber.    BONES/SOFT TISSUES: No acute osseous abnormality.    Please see separate dictated report for findings of the abdomen.      IMPRESSION:  Right lower lobar pulmonary embolism with evidence of right heart strain.    < from: CT Abdomen and Pelvis w/ IV Cont (20 @ 20:39) >  FINDINGS:  LOWER CHEST: Intraluminal filling defect is seen within the partially imaged  right lower lobe pulmonary artery consistent with a pulmonary embolism. (5/1-15) A CTA study of the chest is recommended to further evaluate the pulmonary arteries. The lung bases are clear. No pleural or pericardial effusion. Small fluid collections are noted inferolateral aspect of the right and left breast.    HEPATIC: The liver is normal in appearance with no evidence of mass or bile duct dilatation.      BILIARY: No calcified gallstones are noted.  SPLEEN: Unremarkable.        PANCREAS: The pancreas is normal in size and configuration. No evidence of mass or pancreatitis.     ADRENAL GLANDS: Unremarkable.        KIDNEYS: There is symmetric bilateral renal enhancement. No evidence of hydronephrosis, calcified stones, or solid mass.     ABDOMINOPELVIC NODES: Unremarkable.    PELVIC ORGANS: An IUD is noted. No evidence of pelvic mass, lymphadenopathy, or fluid collection.    PERITONEUM/MESENTERY/BOWEL: Status post gastric surgery. No evidence of bowel obstruction, colitis, inflammatory process, or ascites within the abdomen or pelvis. The appendix is normal in appearance.    BONES/SOFT TISSUES: Postsurgical changes are noted in the anterior abdominal wall of the lower abdomen. This includes stranding within the subcutaneous fatty tissues and small fluid pockets. Infected fluid cannot be excluded radiographically. There is no evidence of a discrete percutaneously drainable abdominal wall collection with gas and fluid. The umbilical structures and periumbilical fat extends deep through the region of the linea alba, in the midline, between the rectus muscles, indenting the surface of the peritoneal cavity anteriorly.    OTHER: No intra-abdominal vascular abnormalities.      IMPRESSION:   1. Intraluminal filling defect is seen within the partially imaged right lower lobe pulmonary artery consistent with a pulmonary embolism. (5/1-15) A CTA study of the chest isrecommended to further evaluate the pulmonary arteries.   2. Postsurgical changes are noted in the anterior abdominal wall of the lower abdomen. This includes stranding within the subcutaneous fatty tissues and small fluid pockets. Infected fluid cannot be excluded radiographically. There is no evidence of a discrete percutaneously drainable abdominal wall collection with gas and fluid.   3. The umbilical structures and periumbilical fat extends deep through the region of the linea alba, in the midline, between the rectus muscles, indenting the surface of the peritoneal cavity anteriorly. GENERAL SURGERY PROGRESS NOTE     SANDRA LAUREN  [address] [place] [address] day: 2d  Procedure: Umbilical wound drainage s/p abdominoplasty  Surgical Attending: Marilia Ny  Overnight events: No acute events overnight. Pain controlled.     T(F): 97 (20 @ 20:30), Max: 99.5 (20 @ 09:09)  HR: 73 (20 @ 20:30) (60 - 74)  BP: 97/55 (20 @ 20:30) (82/53 - 99/51)  RR: 18 (20 @ 20:30) (18 - 18)  SpO2: 98% (20 @ 19:41) (95% - 98%)    20 @ 07:01  -  20 @ 02:08  --------------------------------------------------------  IN:    sodium chloride 0.9%.: 100 mL  Total IN: 100 mL    OUT:  Total OUT: 0 mL    Total NET: 100 mL    DIET/FLUIDS: sodium chloride 0.9%. 1000 milliLiter(s) IV Continuous <Continuous>    ABx: vancomycin  IVPB 1250 milliGRAM(s) IV Intermittent every 12 hours    PHYSICAL EXAM:  GENERAL: NAD, well-appearing  CHEST/LUNG: Clear to auscultation bilaterally  HEART: Regular rate and rhythm  ABDOMEN: Soft, Nontender, Nondistended, umbilical wound s/p drainage  EXTREMITIES:  No clubbing, cyanosis, or edema    Labs:  CAPILLARY BLOOD GLUCOSE                        9.1    6.60  )-----------( 286      ( 2020 06:19 )             27.9       Auto Neutrophil %: 49.3 % (20 @ 06:19)  Auto Immature Granulocyte %: 0.3 % (20 @ 06:19)        141  |  105  |  10  ----------------------------<  97  3.8   |  25  |  0.6<L>      Calcium, Total Serum: 8.3 mg/dL (20 @ 06:19)      LFTs:             5.8  | <0.2 | 11       ------------------[37      ( 2020 06:19 )  3.1  | x    | 6           Lipase:x      Amylase:x         Blood Gas Venous - Lactate: 0.7 mmoL/L (20 @ 18:28)      Coags:     x      ----< x       ( 2020 21:24 )     105.2       CARDIAC MARKERS ( 2020 06:19 )  x     / <0.01 ng/mL / x     / x     / x      CARDIAC MARKERS ( 2020 22:00 )  x     / <0.01 ng/mL / x     / x     / x          Serum Pro-Brain Natriuretic Peptide: 24 pg/mL (20 @ 22:55)      Urinalysis Basic - ( 2020 22:00 )    Color: Light Yellow / Appearance: Clear / S.031 / pH: x  Gluc: x / Ketone: Negative  / Bili: Negative / Urobili: <2 mg/dL   Blood: x / Protein: Trace / Nitrite: Negative   Leuk Esterase: Negative / RBC: x / WBC x   Sq Epi: x / Non Sq Epi: x / Bacteria: x    Culture - Blood (collected 2020 18:41)  Source: .Blood Blood  Preliminary Report (2020 01:01):    No growth to date.    RADIOLOGY & ADDITIONAL TESTS:  < from: CT Angio Chest w/ IV Cont (20 @ 22:43) >  FINDINGS:    PULMONARY EMBOLUS: Right lower lobar pulmonary embolism extending into segmental branches. Evidence of right heart strain with RV:LV ratio of 1.0.    LUNGS, PLEURA, AIRWAYS: The central tracheobronchial tree is patent. Nopulmonary consolidation or mass. No pneumothorax or pleural effusion. No bronchiectasis or honeycombing.    THORACIC NODES: No axillary, mediastinal, or hilar lymphadenopathy.    MEDIASTINUM/GREAT VESSELS: No pericardial effusion. The thoracic aorta and main pulmonary trunk are normal in caliber.    BONES/SOFT TISSUES: No acute osseous abnormality.    Please see separate dictated report for findings of the abdomen.      IMPRESSION:  Right lower lobar pulmonary embolism with evidence of right heart strain.    < from: CT Abdomen and Pelvis w/ IV Cont (20 @ 20:39) >  FINDINGS:  LOWER CHEST: Intraluminal filling defect is seen within the partially imaged  right lower lobe pulmonary artery consistent with a pulmonary embolism. (5/1-15) A CTA study of the chest is recommended to further evaluate the pulmonary arteries. The lung bases are clear. No pleural or pericardial effusion. Small fluid collections are noted inferolateral aspect of the right and left breast.    HEPATIC: The liver is normal in appearance with no evidence of mass or bile duct dilatation.      BILIARY: No calcified gallstones are noted.  SPLEEN: Unremarkable.        PANCREAS: The pancreas is normal in size and configuration. No evidence of mass or pancreatitis.     ADRENAL GLANDS: Unremarkable.        KIDNEYS: There is symmetric bilateral renal enhancement. No evidence of hydronephrosis, calcified stones, or solid mass.     ABDOMINOPELVIC NODES: Unremarkable.    PELVIC ORGANS: An IUD is noted. No evidence of pelvic mass, lymphadenopathy, or fluid collection.    PERITONEUM/MESENTERY/BOWEL: Status post gastric surgery. No evidence of bowel obstruction, colitis, inflammatory process, or ascites within the abdomen or pelvis. The appendix is normal in appearance.    BONES/SOFT TISSUES: Postsurgical changes are noted in the anterior abdominal wall of the lower abdomen. This includes stranding within the subcutaneous fatty tissues and small fluid pockets. Infected fluid cannot be excluded radiographically. There is no evidence of a discrete percutaneously drainable abdominal wall collection with gas and fluid. The umbilical structures and periumbilical fat extends deep through the region of the linea alba, in the midline, between the rectus muscles, indenting the surface of the peritoneal cavity anteriorly.    OTHER: No intra-abdominal vascular abnormalities.      IMPRESSION:   1. Intraluminal filling defect is seen within the partially imaged right lower lobe pulmonary artery consistent with a pulmonary embolism. (5/1-15) A CTA study of the chest isrecommended to further evaluate the pulmonary arteries.   2. Postsurgical changes are noted in the anterior abdominal wall of the lower abdomen. This includes stranding within the subcutaneous fatty tissues and small fluid pockets. Infected fluid cannot be excluded radiographically. There is no evidence of a discrete percutaneously drainable abdominal wall collection with gas and fluid.   3. The umbilical structures and periumbilical fat extends deep through the region of the linea alba, in the midline, between the rectus muscles, indenting the surface of the peritoneal cavity anteriorly.

## 2020-06-20 NOTE — DISCHARGE NOTE NURSING/CASE MANAGEMENT/SOCIAL WORK - PATIENT PORTAL LINK FT
You can access the FollowMyHealth Patient Portal offered by [facility] by registering at the following website: [email or web address]. By joining [employer]’s FollowMyHealth portal, you will also be able to view your health information using other applications (apps) compatible with our system.

## 2020-06-20 NOTE — DISCHARGE NOTE PROVIDER - CARE PROVIDERS DIRECT ADDRESSES
[email or web address]

## 2020-06-20 NOTE — PROGRESS NOTE ADULT - ATTENDING COMMENTS
Pt seen and examined at bedside with surgical team this morning    No acute events overnight  Transitioned to Lovenox  On vancomycin  s/p bedside need aspiration of periumbilical seroma yesterday  3 ml murky old blood removed right side; 10 ml clear serous fluid left side  Pt reports improved abdominal discomfort  Denies fevers, chills, SOB, CP    AVSS  Gen: NAD  Breast: right IMF site with skin maceration, surgical bra re-applied  Abd: soft, NT, no palpable fluid wave, no erythema or drainage from umbilical wound; abd binder applied    A/P: Likely post-op periumbilical seroma without active infection (WBC wnl, no erythema)    -Agree with ID recommend for PO abx.  Appreciate recommendations  -c/w bacitracin ointment BID to right breast and umbilical wound x 5 days then switch to aquaphor  -surgical bra and abdominal binder  -no heavy lifting  -ambulate frequently  -anticoagulation for PE  -medical mgmt and dispo per medicine team  -follow up in [facility] office in 1 week

## 2020-06-21 LAB
-  AMIKACIN: SIGNIFICANT CHANGE UP
-  AMOXICILLIN/CLAVULANIC ACID: SIGNIFICANT CHANGE UP
-  AMPICILLIN/SULBACTAM: SIGNIFICANT CHANGE UP
-  AMPICILLIN: SIGNIFICANT CHANGE UP
-  AZTREONAM: SIGNIFICANT CHANGE UP
-  CEFAZOLIN: SIGNIFICANT CHANGE UP
-  CEFEPIME: SIGNIFICANT CHANGE UP
-  CEFOXITIN: SIGNIFICANT CHANGE UP
-  CEFTRIAXONE: SIGNIFICANT CHANGE UP
-  CIPROFLOXACIN: SIGNIFICANT CHANGE UP
-  ERTAPENEM: SIGNIFICANT CHANGE UP
-  GENTAMICIN: SIGNIFICANT CHANGE UP
-  IMIPENEM: SIGNIFICANT CHANGE UP
-  LEVOFLOXACIN: SIGNIFICANT CHANGE UP
-  MEROPENEM: SIGNIFICANT CHANGE UP
-  PIPERACILLIN/TAZOBACTAM: SIGNIFICANT CHANGE UP
-  TOBRAMYCIN: SIGNIFICANT CHANGE UP
-  TRIMETHOPRIM/SULFAMETHOXAZOLE: SIGNIFICANT CHANGE UP
METHOD TYPE: SIGNIFICANT CHANGE UP

## 2020-06-22 LAB
-  AMIKACIN: SIGNIFICANT CHANGE UP
-  AMOXICILLIN/CLAVULANIC ACID: SIGNIFICANT CHANGE UP
-  AMPICILLIN/SULBACTAM: SIGNIFICANT CHANGE UP
-  AMPICILLIN: SIGNIFICANT CHANGE UP
-  AZTREONAM: SIGNIFICANT CHANGE UP
-  CEFAZOLIN: SIGNIFICANT CHANGE UP
-  CEFEPIME: SIGNIFICANT CHANGE UP
-  CEFOXITIN: SIGNIFICANT CHANGE UP
-  CEFTRIAXONE: SIGNIFICANT CHANGE UP
-  CIPROFLOXACIN: SIGNIFICANT CHANGE UP
-  ERTAPENEM: SIGNIFICANT CHANGE UP
-  GENTAMICIN: SIGNIFICANT CHANGE UP
-  LEVOFLOXACIN: SIGNIFICANT CHANGE UP
-  MEROPENEM: SIGNIFICANT CHANGE UP
-  PIPERACILLIN/TAZOBACTAM: SIGNIFICANT CHANGE UP
-  TOBRAMYCIN: SIGNIFICANT CHANGE UP
-  TRIMETHOPRIM/SULFAMETHOXAZOLE: SIGNIFICANT CHANGE UP
METHOD TYPE: SIGNIFICANT CHANGE UP

## 2020-06-23 DIAGNOSIS — T81.31XA DISRUPTION OF EXTERNAL OPERATION (SURGICAL) WOUND, NOT ELSEWHERE CLASSIFIED, INITIAL ENCOUNTER: ICD-10-CM

## 2020-06-23 DIAGNOSIS — T81.718A COMPLICATION OF OTHER ARTERY FOLLOWING A PROCEDURE, NOT ELSEWHERE CLASSIFIED, INITIAL ENCOUNTER: ICD-10-CM

## 2020-06-23 DIAGNOSIS — I26.99 OTHER PULMONARY EMBOLISM WITHOUT ACUTE COR PULMONALE: ICD-10-CM

## 2020-06-23 DIAGNOSIS — L76.33 POSTPROCEDURAL SEROMA OF SKIN AND SUBCUTANEOUS TISSUE FOLLOWING A DERMATOLOGIC PROCEDURE: ICD-10-CM
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
## 2020-06-23 DIAGNOSIS — L98.8 OTHER SPECIFIED DISORDERS OF THE SKIN AND SUBCUTANEOUS TISSUE: ICD-10-CM

## 2020-06-23 DIAGNOSIS — T81.49XA INFECTION FOLLOWING A PROCEDURE, OTHER SURGICAL SITE, INITIAL ENCOUNTER: ICD-10-CM

## 2020-06-23 DIAGNOSIS — Z48.89 ENCOUNTER FOR OTHER SPECIFIED SURGICAL AFTERCARE: ICD-10-CM

## 2020-06-24 LAB
CULTURE RESULTS: SIGNIFICANT CHANGE UP
ORGANISM # SPEC MICROSCOPIC CNT: SIGNIFICANT CHANGE UP
SPECIMEN SOURCE: SIGNIFICANT CHANGE UP

## 2020-06-26 ENCOUNTER — APPOINTMENT (OUTPATIENT)
Dept: PLASTIC SURGERY | Facility: CLINIC | Age: 35
End: 2020-06-26
Payer: COMMERCIAL

## 2020-06-26 DIAGNOSIS — I21.9 ACUTE MYOCARDIAL INFARCTION, UNSPECIFIED: ICD-10-CM

## 2020-06-26 DIAGNOSIS — E11.9 TYPE 2 DIABETES MELLITUS W/OUT COMPLICATIONS: ICD-10-CM

## 2020-06-26 DIAGNOSIS — Z78.9 OTHER SPECIFIED HEALTH STATUS: ICD-10-CM

## 2020-06-26 PROCEDURE — 99213 OFFICE O/P EST LOW 20 MIN: CPT

## 2020-06-28 PROBLEM — Z78.9 NON-SMOKER: Status: ACTIVE | Noted: 2017-04-04

## 2020-06-28 PROBLEM — E11.9 DIABETES: Status: ACTIVE | Noted: 2020-06-28

## 2020-06-28 PROBLEM — I21.9 MYOCARDIAL INFARCTION: Status: ACTIVE | Noted: 2020-06-28

## 2020-06-28 NOTE — ASSESSMENT
[FreeTextEntry1] : 35F with post-PE on Eliquis s/p breast reduction and lift with abdominoplasty on 6/3/20 in [place], [place] (Dr. Segundo) who presents with 2 days of purulent drainage from umbilical wound. No cellulitis or palpable fluid collection.  Open umbilical wound.\par \par -REviewed culture results, Enterbacter cloacae sensitive to Bactrim\par -c/w bactrim\par -c/w compression garments\par -reviewed LWC to umbilical wound: bactricinn BID with gauze packing\par -ambulation\par -c/w eliquis AC; followup with pulmonology for hypercoagulability workup\par -follow up in 2 weeks

## 2020-06-28 NOTE — DATA REVIEWED
[FreeTextEntry1] : Enterobacter cloacae complex\par Minimum Inhibitory Concentration\par \par Amikacin    S (<=16) \par Amoxicillin/Clavulanic Acid   R (>16/8) \par Ampicillin   R (>16)    \par Ampicillin/Sulbactam  R (>16/8)\par Aztreonam   S (<=4) \par Cefazolin   R (>16) \par Cefepime  S (<=2)\par Cefoxitin  R (>16) \par Ceftriaxone  S (<=1) \par Ciprofloxacin  S (<=0.25) \par Ertapenem  S (<=0.5) \par Gentamicinn  S (<=2) \par Imipenem   S (<=1) \par Levofloxacin  S (<=0.5) \par Meropenem  S (<=1) \par Piperacillin/Tazobactam  S (<=8) \par Tobramycin  S (<=2) \par Trimethoprim/Sulfamethoxazole  S (<=0.5/9.5) \par \par CTA: PULMONARY EMBOLUS: Right lower lobar pulmonary embolism extending into\par segmental branches. Evidence of right heart strain with RV:LV ratio of 1.0.\par IMPRESSION: Right lower lobar pulmonary embolism with evidence of right heart strain.\par \par CT A/P\par BONES/SOFT TISSUES: Postsurgical changes are noted in the anterior abdominal\par wall of the lower abdomen. This includes stranding within the subcutaneous\par fatty tissues and small fluid pockets. Infected fluid cannot be excluded\par radiographically. There is no evidence of a discrete percutaneously drainable\par abdominal wall collection with gas and fluid. The umbilical structures and\par periumbilical fat extends deep through the region of the linea alba, in the\par midline, between the rectus muscles, indenting the surface of the peritoneal\par cavity anteriorly.\par \par OTHER: No intra-abdominal vascular abnormalities.\par \par \par IMPRESSION:\par 1. Intraluminal filling defect is seen within the partially imaged right lower\par lobe pulmonary artery consistent with a pulmonary embolism. (5/1-15) A CTA\par study of the chest isrecommended to further evaluate the pulmonary arteries.\par 2. Postsurgical changes are noted in the anterior abdominal wall of the lower\par abdomen. This includes stranding within the subcutaneous fatty tissues and\par small fluid pockets. Infected fluid cannot be excluded radiographically. There\par is no evidence of a discrete percutaneously drainable abdominal wall collection\par with gas and fluid.\par 3. The umbilical structures and periumbilical fat extends deep through the\par region of the linea alba, in the midline, between the rectus muscles, indenting\par \par \par AVSS\par Gen: NAD, AAOx3, non-toxic appearing. Able to speak in full sentences\par Breasts: BL breasts with wise-pattern incision, healing well, no palpable fluid\par BL, no erythema\par Abd: abd flap warm and well perfused, insensate, no tenderness on palpation,\par +periumbilical fluid wave BL, no overlying erythema, no drainage from incisions\par at mons or umbilicus. There is small wound dehiscence at right umbilicus with\par granulating fat. No palpable hernia.\par \par CT Abd/Pelvis reviewed:\par 1. Intraluminal filling defect is seen within the partially imaged right lower\par lobe pulmonary artery consistent with a pulmonary embolism. (5/1-15) A CTA\par study of the chest is recommended to further evaluate the pulmonary arteries.\par \par 2. Postsurgical changes are noted in the anterior abdominal wall of the lower\par abdomen. This includes stranding within the subcutaneous fatty tissues and\par small fluid pockets. Infected fluid cannot be excluded radiographically. There\par is no evidence of a discrete percutaneously drainable abdominal wall collection\par with gas and fluid.\par \par 3. The umbilical structures and periumbilical fat extends deep through the\par region of the linea alba, in the midline, between the rectus muscles, indenting\par the surface of the peritoneal cavity anteriorly.\par

## 2020-06-28 NOTE — HISTORY OF PRESENT ILLNESS
[FreeTextEntry1] : 36 yo F s/p BBR and lipoabdominoplasty and SAL BL flank and back on 6/3/20 in [place] (Dr. Segundo), who reports PTA 2 days of drainage from abdominoplasty incision near mons. Drainage was described as "clear yellow with red stain" with no foul odor. She also reported similar finding from umbilicus. She had concern regarding umbilical wound is open. She denies fevers, chills, SOB, CP, abdominal pain, abdominal\par redness. This report is differs from prior history given elsewhere. She reports compliance with surgical bra and compression garmennt/abdominal binder.  She was admitted for PE RLL and started on heparin drip AC then bridged to Eliquis. \par \par Plastic Surgery was consulted for wound drainage, r/o post-op infection.  CT a/p demonstrated likely post-op small seroma collections mikey-umbilical BL.  BL needle aspiration of fluid demonstrated right turbid hematoma fluid and left serous fluid; they were sent for cultures.  thre was no external signs of cellulitis or abscess.  She has sarbjit open umbilical wound without purulent drainage.  ID consult recommended Augmentinn and BActrim x 2 weeks.  Discharged from hospital 6/20/20\par \par Here for post-hospitalization follow up.  Compliant with compression garments and surgical bra as well as umbilical LWC with bacitracin/gauze.  Denies fevers, chills, redness, purulent drainage, SOB, CP.\par \par \par

## 2020-06-28 NOTE — PHYSICAL EXAM
[de-identified] : well-appearing, NAD [de-identified] : BL breast with excellent shape and volume. incisions healing well except epidermal slough right breast lateral IMF; no palpable fluid collection or erythema [de-identified] : EOMI [de-identified] : soft, NT, ND,abdominal flap WWP, no palpable fluid wave, low transverse incision C/D/i, umbilical wound dehiscence of right with umbilical skin slough, no purulent drainage or flap erythema

## 2020-07-02 ENCOUNTER — TRANSCRIPTION ENCOUNTER (OUTPATIENT)
Age: 35
End: 2020-07-02

## 2020-07-07 ENCOUNTER — LABORATORY RESULT (OUTPATIENT)
Age: 35
End: 2020-07-07

## 2020-07-07 ENCOUNTER — APPOINTMENT (OUTPATIENT)
Dept: HEMATOLOGY ONCOLOGY | Facility: CLINIC | Age: 35
End: 2020-07-07
Payer: COMMERCIAL

## 2020-07-07 VITALS
HEART RATE: 56 BPM | BODY MASS INDEX: 36.9 KG/M2 | SYSTOLIC BLOOD PRESSURE: 103 MMHG | HEIGHT: 61.5 IN | WEIGHT: 198 LBS | TEMPERATURE: 97.1 F | RESPIRATION RATE: 14 BRPM | DIASTOLIC BLOOD PRESSURE: 61 MMHG

## 2020-07-07 PROCEDURE — 99244 OFF/OP CNSLTJ NEW/EST MOD 40: CPT

## 2020-07-08 LAB
FERRITIN SERPL-MCNC: 62 NG/ML
HCT VFR BLD CALC: 33.9 %
HGB BLD-MCNC: 10.8 G/DL
IRON SATN MFR SERPL: 22 %
IRON SERPL-MCNC: 80 UG/DL
MCHC RBC-ENTMCNC: 30.2 PG
MCHC RBC-ENTMCNC: 31.9 G/DL
MCV RBC AUTO: 94.7 FL
PLATELET # BLD AUTO: 268 K/UL
PMV BLD: 10.3 FL
RBC # BLD: 3.58 M/UL
RBC # FLD: 13.7 %
TIBC SERPL-MCNC: 357 UG/DL
UIBC SERPL-MCNC: 277 UG/DL
WBC # FLD AUTO: 9.45 K/UL

## 2020-07-09 NOTE — HISTORY OF PRESENT ILLNESS
[de-identified] : 35 year old female here today for evaluation and management of new PE.  She was not seen in [facility] this is a new consult  at the request of the medicine team. \par \par Trudi is a 34 yo female of  decent who has no significant PMH and PSHx including recent breast reduction, gastric sleeve, and . \par She had a surgery in [place] on 6/3/2020 when she underwent a breast reduction and lift with abdominoplasty. She was admitted to the ED on 2020 with complaints of surgical site infection. CT C/A/P showed no discrete abdominal wall collection with an incidental  PE finding. CTA chest was done showing right lower lobar pulmonary embolism with evidence of right heart strain. She was started on Heparin inpatient and was bridged to Eliquis. She is currently on Eliquis 5mg BID. Patient denies any OWENS, cough, chest pain, SOB at rest, LE swelling at this time. \par \par B/L LE Duplex 2020\par  Impression:\par No evidence of deep venous thrombosis in the bilateral lower extremities.\par \par CT Angio Chest 2020 \par PULMONARY EMBOLUS: Right lower lobar pulmonary embolism extending into segmental branches. Evidence of right heart strain with RV:LV ratio of 1.0.\par IMPRESSION:\par Right lower lobar pulmonary embolism with evidence of right heart strain.\par \par This is her first VTE event. No OCP use. Never a smoker. She has three children all  deliveries. She has no personal or family history of clotting disorders. She did recently travel from [place] but stated she ambulated frequently during her travels and post op. She was diagnosed with COVID-19 in 2020; with complaints of loss of smell, fever, and weakness. Since PE developed after three months from diagnosis it is unlikely that it is related to the virus and its side effects. In addition, she was found to have a history of mild anemia. She is not currently on any iron supplements. She has heavy menses lasting about 5 days. She noted that after starting the eliquis she has developed a mild increase in bleeding and clotting. \par \par

## 2020-07-09 NOTE — END OF VISIT
[FreeTextEntry3] : I was physically present for the key portions of the evaluation and management service provided.  I agree with the history and physical, and plan which I have reviewed and edited where appropriate.\par She was seen today for initial evaluation for an PE. She had COVID but this was few months back prior to her event and she had no imoobility with her recent surgery thus in my opinion she had an unprovoked event. I explained we have several options. Since she is a low bleeding risk patient, her menstrual periods have worsened thought, we can consider indefinite anticoagulation given the unprovoked nature of her event but this event was indecently found  and thus it maybe more appropriate to risk stratify her after 3-6 months of anticoagulation and if work up does not show a significant thrombophilia and she preferes to stop AC we can stop and check APS and D dimer in one month upon completion of AC and observe her if studies are negative.\par \par RTC in 2 months.

## 2020-07-09 NOTE — ASSESSMENT
[FreeTextEntry1] : 35 year old female with recent surgery in [place] on 6/3/2020 for breast reduction with abdominoplasty. She later developed abdominal abscess in wich CT revealed incidental RLL PE with RV strain. \par \par # PE\par - Likely unprovoked; even in light of recent surgery and travel patient states she was frequently ambulating. We suggest that she completes a total of 3 months of Eliquis 5mg BID. \par - At that time she will come back and we will complete hypercoagulable work up to determine coagulation duration  \par - LE duplex negative \par \par # Normocytic Anemia: maybe due to menstrual blood loss\par - Will check ferritin and iron studies \par - Will prescribe iron supplementation if needed \par \par RTC in two months, sooner if needed. Plan to complete herypercoag workup. \par

## 2020-07-09 NOTE — PHYSICAL EXAM
[Fully active, able to carry on all pre-disease performance without restriction] : Status 0 - Fully active, able to carry on all pre-disease performance without restriction [Normal] : normoactive bowel sounds, soft and nontender, no hepatosplenomegaly or masses appreciated [de-identified] : abdominal incision and breast incision- dressing C/D/I

## 2020-07-09 NOTE — OB HISTORY
[Definite:  ___ (Date)] : the last menstrual period was [unfilled] [___] : Total Pregnancies: [unfilled] [Normal Amount/Duration] : was abnormal

## 2020-07-10 ENCOUNTER — APPOINTMENT (OUTPATIENT)
Dept: PLASTIC SURGERY | Facility: CLINIC | Age: 35
End: 2020-07-10
Payer: COMMERCIAL

## 2020-07-10 PROCEDURE — 99212 OFFICE O/P EST SF 10 MIN: CPT

## 2020-07-10 NOTE — PHYSICAL EXAM
[de-identified] : well-appearing, NAD [de-identified] : EOMI [de-identified] : BL breast with excellent shape and volume. incisions healing well except few suture reactions; no palpable fluid collection or erythema [de-identified] : soft, NT, ND,abdominal flap WWP, no palpable fluid wave, low transverse incision C/D/i, partial thickness wound to surface of umbilicus, wound base granulating, no tunneling, no purulent drainage or flap erythema

## 2020-07-10 NOTE — ASSESSMENT
[FreeTextEntry1] : 35F with post-PE on Eliquis s/p breast reduction and lift with abdominoplasty on 6/3/20 in [place], [place] (Dr. Segundo) who presents with 2 days of purulent drainage from umbilical wound. No cellulitis or palpable fluid collection.  Healing umbilical wound.\par \par -c/w compression garments\par -aquaphor to all incisions, cover umbilicus with gauze until healed\par -start scaraway silicone patches in 1 week\par -cleanse normally\par -ambulation\par -c/w eliquis AC; followup with pulmonology for hypercoagulability workup\par -follow up 1 month with Dr. Ny for likely final postop check\par -encouraged to follow up with plastic surgeon in [place]

## 2020-07-10 NOTE — HISTORY OF PRESENT ILLNESS
[FreeTextEntry1] : 34 yo F s/p BBR and lipoabdominoplasty and SAL BL flank and back on 6/3/20 in [place] (Dr. Segundo), who reports PTA 2 days of drainage from abdominoplasty incision near mons. Drainage was described as "clear yellow with red stain" with no foul odor. She also reported similar finding from umbilicus. She had concern regarding umbilical wound is open. She denies fevers, chills, SOB, CP, abdominal pain, abdominal\par redness. This report is differs from prior history given elsewhere. She reports compliance with surgical bra and compression garmennt/abdominal binder.  She was admitted for PE RLL and started on heparin drip AC then bridged to Eliquis. \par \par Plastic Surgery was consulted for wound drainage, r/o post-op infection.  CT a/p demonstrated likely post-op small seroma collections mikey-umbilical BL.  BL needle aspiration of fluid demonstrated right turbid hematoma fluid and left serous fluid; they were sent for cultures.  there was no external signs of cellulitis or abscess.  She has an open umbilical wound without purulent drainage.  ID consult recommended Augmentin and Bactrim x 2 weeks.  Discharged from hospital 6/20/20\par \par Here for post-hospitalization follow up.  Compliant with compression garments and surgical bra as well as umbilical LWC with bacitracin/gauze.  Denies fevers, chills, redness, purulent drainage, SOB, CP.\par \par Interval hx (7/10/20): Pt presents for follow up. Denies f/c, CP/SOB, calf pain, breast/abdominal pain, or drainage. Reports occasional chest pressure. Taking Eliquis as prescribed. Completed PO abx.

## 2020-08-07 ENCOUNTER — APPOINTMENT (OUTPATIENT)
Dept: PLASTIC SURGERY | Facility: CLINIC | Age: 35
End: 2020-08-07
Payer: COMMERCIAL

## 2020-08-07 PROCEDURE — 99213 OFFICE O/P EST LOW 20 MIN: CPT

## 2020-08-07 NOTE — PHYSICAL EXAM
[de-identified] : well-appearing, NAD [de-identified] : EOMI [de-identified] : BL breast with excellent shape and volume. incisions healing well except few suture reactions; no palpable fluid collection or erythema [de-identified] : soft, NT, ND,abdominal flap WWP, no palpable fluid wave, low transverse incision C/D/i, base of umbilicus with 1 cm granulating wound without drainage; resolved abdominal flap edema

## 2020-08-07 NOTE — ASSESSMENT
[FreeTextEntry1] : 35F with post-PE on Eliquis s/p breast reduction and lift with abdominoplasty on 6/3/20 in [place], [place] (Dr. Segundo) who presents with 2 days of purulent drainage from umbilical wound. No cellulitis or palpable fluid collection.  Healing umbilical wound, small area of granulation tissue\par \par -c/w compression garments x 1 month PRN\par -may resume regular bra use\par -aquaphor umbilicus daily/cover with gauze until healed\par -c/w scaraway silicone sheeting x 3 months\par -may resume all physical activity\par -c/w eliquis AC; followup with pulmonology for hypercoagulability workup\par -counseled follow up with plastic surgeon in [place]\par -all questions were answered to her apparent satisfaction\par -follow up 1 month with PA for final umbilical wound check\par

## 2020-08-07 NOTE — HISTORY OF PRESENT ILLNESS
[FreeTextEntry1] : 36 yo F s/p BBR and lipoabdominoplasty and SAL BL flank and back on 6/3/20 in [place] (Dr. Segundo), who reports PTA 2 days of drainage from abdominoplasty incision near mons. Drainage was described as "clear yellow with red stain" with no foul odor. She also reported similar finding from umbilicus. She had concern regarding umbilical wound is open. She denies fevers, chills, SOB, CP, abdominal pain, abdominal\par redness. This report is differs from prior history given elsewhere. She reports compliance with surgical bra and compression garmennt/abdominal binder.  She was admitted for PE RLL and started on heparin drip AC then bridged to Eliquis. \par \par Plastic Surgery was consulted for wound drainage, r/o post-op infection.  CT a/p demonstrated likely post-op small seroma collections mikey-umbilical BL.  BL needle aspiration of fluid demonstrated right turbid hematoma fluid and left serous fluid; they were sent for cultures.  there was no external signs of cellulitis or abscess.  She has an open umbilical wound without purulent drainage.  ID consult recommended Augmentin and Bactrim x 2 weeks.  Discharged from hospital 6/20/20\par \par Here for post-hospitalization follow up.  Compliant with compression garments and surgical bra as well as umbilical LWC with bacitracin/gauze.  Denies fevers, chills, redness, purulent drainage, SOB, CP.\par \par Interval hx (7/10/20): Pt presents for follow up. Denies f/c, CP/SOB, calf pain, breast/abdominal pain, or drainage. Reports occasional chest pressure. Taking Eliquis as prescribed. Completed PO abx. \par \par Interval hx (8/7/20): 2 month s/p BBR and lipoabdominoplasty ([place]).  Went to pulmonologist, who recommend hypercoagulability w/u in 3 months. She reports daily aqupahor to umbilicus wound, which is now smaller.  compliant with silicone sheeting to all incisions.  Inquiring about final appearance of abdomen.  Denies fevers, chills, SOB, calf pain.

## 2020-09-11 ENCOUNTER — APPOINTMENT (OUTPATIENT)
Dept: PLASTIC SURGERY | Facility: CLINIC | Age: 35
End: 2020-09-11

## 2020-09-15 ENCOUNTER — OUTPATIENT (OUTPATIENT)
Dept: OUTPATIENT SERVICES | Facility: HOSPITAL | Age: 35
LOS: 1 days | Discharge: HOME | End: 2020-09-15

## 2020-09-15 ENCOUNTER — APPOINTMENT (OUTPATIENT)
Dept: HEMATOLOGY ONCOLOGY | Facility: CLINIC | Age: 35
End: 2020-09-15
Payer: COMMERCIAL

## 2020-09-15 ENCOUNTER — LABORATORY RESULT (OUTPATIENT)
Age: 35
End: 2020-09-15

## 2020-09-15 VITALS
DIASTOLIC BLOOD PRESSURE: 75 MMHG | TEMPERATURE: 97.1 F | RESPIRATION RATE: 14 BRPM | HEART RATE: 54 BPM | BODY MASS INDEX: 37.28 KG/M2 | SYSTOLIC BLOOD PRESSURE: 117 MMHG | HEIGHT: 61.5 IN | WEIGHT: 200 LBS

## 2020-09-15 DIAGNOSIS — I26.99 OTHER PULMONARY EMBOLISM WITHOUT ACUTE COR PULMONALE: ICD-10-CM

## 2020-09-15 DIAGNOSIS — Z98.891 HISTORY OF UTERINE SCAR FROM PREVIOUS SURGERY: Chronic | ICD-10-CM

## 2020-09-15 PROCEDURE — 99213 OFFICE O/P EST LOW 20 MIN: CPT

## 2020-09-15 RX ORDER — APIXABAN 5 MG/1
5 TABLET, FILM COATED ORAL
Qty: 60 | Refills: 3 | Status: ACTIVE | COMMUNITY
Start: 2020-07-07 | End: 1900-01-01

## 2020-09-16 ENCOUNTER — APPOINTMENT (OUTPATIENT)
Dept: PLASTIC SURGERY | Facility: CLINIC | Age: 35
End: 2020-09-16
Payer: COMMERCIAL

## 2020-09-16 LAB
DEPRECATED D DIMER PPP IA-ACNC: 64 NG/ML DDU
FERRITIN SERPL-MCNC: 19 NG/ML
HCT VFR BLD CALC: 35.9 %
HGB BLD-MCNC: 11.6 G/DL
IRON SATN MFR SERPL: 11 %
IRON SERPL-MCNC: 45 UG/DL
MCHC RBC-ENTMCNC: 30.7 PG
MCHC RBC-ENTMCNC: 32.3 G/DL
MCV RBC AUTO: 95 FL
PLATELET # BLD AUTO: 267 K/UL
PMV BLD: 10.7 FL
RBC # BLD: 3.78 M/UL
RBC # FLD: 14 %
TIBC SERPL-MCNC: 421 UG/DL
UIBC SERPL-MCNC: 376 UG/DL
WBC # FLD AUTO: 4.67 K/UL

## 2020-09-16 PROCEDURE — 99212 OFFICE O/P EST SF 10 MIN: CPT

## 2020-09-16 NOTE — PHYSICAL EXAM
[de-identified] : EOMI [de-identified] : BL breast with excellent shape and volume. incisions healing well with early hypertropic scarring to IMF scars; no palpable fluid collection or erythema [de-identified] : well-appearing, NAD [de-identified] : soft, NT, ND,abdominal flap WWP, no palpable fluid wave, low transverse incision C/D/i, umbilicus with 3mm granuloma without drainage; resolved abdominal flap edema, no open wounds

## 2020-09-16 NOTE — HISTORY OF PRESENT ILLNESS
[FreeTextEntry1] : 34 yo F s/p BBR and lipoabdominoplasty and SAL BL flank and back on 6/3/20 in [place] (Dr. Segundo), who reports PTA 2 days of drainage from abdominoplasty incision near mons. Drainage was described as "clear yellow with red stain" with no foul odor. She also reported similar finding from umbilicus. She had concern regarding umbilical wound is open. She denies fevers, chills, SOB, CP, abdominal pain, abdominal\par redness. This report is differs from prior history given elsewhere. She reports compliance with surgical bra and compression garmennt/abdominal binder.  She was admitted for PE RLL and started on heparin drip AC then bridged to Eliquis. \par \par Plastic Surgery was consulted for wound drainage, r/o post-op infection.  CT a/p demonstrated likely post-op small seroma collections mikey-umbilical BL.  BL needle aspiration of fluid demonstrated right turbid hematoma fluid and left serous fluid; they were sent for cultures.  there was no external signs of cellulitis or abscess.  She has an open umbilical wound without purulent drainage.  ID consult recommended Augmentin and Bactrim x 2 weeks.  Discharged from hospital 6/20/20\par \par Here for post-hospitalization follow up.  Compliant with compression garments and surgical bra as well as umbilical LWC with bacitracin/gauze.  Denies fevers, chills, redness, purulent drainage, SOB, CP.\par \par Interval hx (7/10/20): Pt presents for follow up. Denies f/c, CP/SOB, calf pain, breast/abdominal pain, or drainage. Reports occasional chest pressure. Taking Eliquis as prescribed. Completed PO abx. \par \par Interval hx (8/7/20): 2 month s/p BBR and lipoabdominoplasty ([place]).  Went to pulmonologist, who recommend hypercoagulability w/u in 3 months. She reports daily aqupahor to umbilicus wound, which is now smaller.  compliant with silicone sheeting to all incisions.  Inquiring about final appearance of abdomen.  Denies fevers, chills, SOB, calf pain.\par \par Interval hx (9/16/20): 3 months postop. Saw hematologist yesterday and will continue Eliquis for 3 more months. Reports occasional bleeding from abdominal wound. Compliant with silicone sheeting.

## 2020-09-16 NOTE — ASSESSMENT
[FreeTextEntry1] : 35F with post-PE on Eliquis s/p breast reduction and lift with abdominoplasty on 6/3/20 in [place], [place] (Dr. Segundo) who presents with 2 days of purulent drainage from umbilical wound. No cellulitis or palpable fluid collection.  Healing umbilical wound with small granuloma\par \par -silver nitrate applied to umbilicus\par -no garment or activity restrictions\par -continue daily aquaphor to umbilicus\par -c/w scaraway silicone sheeting\par -c/w eliquis AC per hematologist\par -counseled follow up with plastic surgeon in [place]\par -all questions were answered to her apparent satisfaction\par -follow up 1 month with PA for possible repeat silver nitrate PRN

## 2020-09-18 NOTE — REVIEW OF SYSTEMS
[Negative] : Heme/Lymph [Fever] : no fever [Chills] : no chills [Chest Pain] : no chest pain [Palpitations] : no palpitations [Easy Bleeding] : no tendency for easy bleeding [FreeTextEntry6] : denies SOB [de-identified] : surgical site

## 2020-09-18 NOTE — HISTORY OF PRESENT ILLNESS
[de-identified] : 35 year old female here today for evaluation and management of new PE.  She was not seen in [facility] this is a new consult  at the request of the medicine team. \par \par Trudi is a 36 yo female of  decent who has no significant PMH and PSHx including recent breast reduction, gastric sleeve, and . \par She had a surgery in [place] on 6/3/2020 when she underwent a breast reduction and lift with abdominoplasty. She was admitted to the ED on 2020 with complaints of surgical site infection. CT C/A/P showed no discrete abdominal wall collection with an incidental  PE finding. CTA chest was done showing right lower lobar pulmonary embolism with evidence of right heart strain. She was started on Heparin inpatient and was bridged to Eliquis. She is currently on Eliquis 5mg BID. Patient denies any OWENS, cough, chest pain, SOB at rest, LE swelling at this time. \par \par B/L LE Duplex 2020\par  Impression:\par No evidence of deep venous thrombosis in the bilateral lower extremities.\par \par CT Angio Chest 2020 \par PULMONARY EMBOLUS: Right lower lobar pulmonary embolism extending into segmental branches. Evidence of right heart strain with RV:LV ratio of 1.0.\par IMPRESSION:\par Right lower lobar pulmonary embolism with evidence of right heart strain.\par \par This is her first VTE event. No OCP use. Never a smoker. She has three children all  deliveries. She has no personal or family history of clotting disorders. She did recently travel from [place] but stated she ambulated frequently during her travels and post op. She was diagnosed with COVID-19 in 2020; with complaints of loss of smell, fever, and weakness. Since PE developed after three months from diagnosis it is unlikely that it is related to the virus and its side effects. In addition, she was found to have a history of mild anemia. She is not currently on any iron supplements. She has heavy menses lasting about 5 days. She noted that after starting the eliquis she has developed a mild increase in bleeding and clotting. \par \par  [de-identified] : 9/15/2020\par Patient is here for a follow-up visit for hx of PE.  She is feeling well with no new complaints.  Patient denies fever, chills, worsening of fatigue/SOB, pica, lightheadedness or bleeding.  She does report her menstrual cycle is slightly heavier since being on anticoagulation.  She is anticipating followup with PULM, Dr. Talamantes at the end of the month.  She has been tolerating Eliquis BiD for about 3 months now, since 06/2020.

## 2020-09-18 NOTE — END OF VISIT
[FreeTextEntry3] : I was physically present for the key portions of the evaluation and management service provided.  I agree with the history and physical, and plan which I have reviewed and edited where appropriate.\par We spoke about anticoagulation  duration. She decided to proceed with 3 more months of treatment and than to come back and proceed from there.

## 2020-09-18 NOTE — PHYSICAL EXAM
[Fully active, able to carry on all pre-disease performance without restriction] : Status 0 - Fully active, able to carry on all pre-disease performance without restriction [Normal] : normoactive bowel sounds, soft and nontender, no hepatosplenomegaly or masses appreciated [de-identified] : abdominal incision and breast incision- dressing reportedly C/D/I; she is wearing dress today

## 2020-09-18 NOTE — ASSESSMENT
[FreeTextEntry1] : 35 year old female with recent surgery in [place] on 6/3/2020 for breast reduction with abdominoplasty. She later developed abdominal abscess in wich CT revealed incidental RLL PE with RV strain. \par \par # PE\par - Likely unprovoked; even in light of recent surgery and travel patient states she was frequently ambulating. \par - she is s/p total of 3 months of Eliquis 5mg BID as of 09/2020\par - We had a discussion regarding risks versus benefits of stopping anticoagulation at 3 months.  Written material provided.  She states that she is more comfortable continuing for a total of 6 months as the side effects are minimal.  She will come back in another 3 months and we will complete hypercoagulable work up to determine coagulation duration at that time\par - LE duplex negative \par -will check  D-dimer\par \par # Normocytic Anemia: maybe due to menstrual blood loss\par - Will check CBC, ferritin and iron studies today \par - Will prescribe iron supplementation if needed \par \par RTC in 3 months, sooner if needed.

## 2020-10-08 ENCOUNTER — APPOINTMENT (OUTPATIENT)
Dept: PLASTIC SURGERY | Facility: CLINIC | Age: 35
End: 2020-10-08
Payer: COMMERCIAL

## 2020-10-08 DIAGNOSIS — S31.105A UNSPECIFIED OPEN WOUND OF ABDOMINAL WALL, PERIUMBILIC REGION W/OUT PENETRATION INTO PERITONEAL CAVITY, INITIAL ENCOUNTER: ICD-10-CM

## 2020-10-08 PROCEDURE — 99212 OFFICE O/P EST SF 10 MIN: CPT

## 2020-10-08 NOTE — HISTORY OF PRESENT ILLNESS
[FreeTextEntry1] : 36 yo F s/p BBR and lipoabdominoplasty and SAL BL flank and back on 6/3/20 in [place] (Dr. Segundo), who reports PTA 2 days of drainage from abdominoplasty incision near mons. Drainage was described as "clear yellow with red stain" with no foul odor. She also reported similar finding from umbilicus. She had concern regarding umbilical wound is open. She denies fevers, chills, SOB, CP, abdominal pain, abdominal\par redness. This report is differs from prior history given elsewhere. She reports compliance with surgical bra and compression garmennt/abdominal binder.  She was admitted for PE RLL and started on heparin drip AC then bridged to Eliquis. \par \par Plastic Surgery was consulted for wound drainage, r/o post-op infection.  CT a/p demonstrated likely post-op small seroma collections mikey-umbilical BL.  BL needle aspiration of fluid demonstrated right turbid hematoma fluid and left serous fluid; they were sent for cultures.  there was no external signs of cellulitis or abscess.  She has an open umbilical wound without purulent drainage.  ID consult recommended Augmentin and Bactrim x 2 weeks.  Discharged from hospital 6/20/20\par \par Here for post-hospitalization follow up.  Compliant with compression garments and surgical bra as well as umbilical LWC with bacitracin/gauze.  Denies fevers, chills, redness, purulent drainage, SOB, CP.\par \par Interval hx (7/10/20): Pt presents for follow up. Denies f/c, CP/SOB, calf pain, breast/abdominal pain, or drainage. Reports occasional chest pressure. Taking Eliquis as prescribed. Completed PO abx. \par \par Interval hx (8/7/20): 2 month s/p BBR and lipoabdominoplasty ([place]).  Went to pulmonologist, who recommend hypercoagulability w/u in 3 months. She reports daily aqupahor to umbilicus wound, which is now smaller.  compliant with silicone sheeting to all incisions.  Inquiring about final appearance of abdomen.  Denies fevers, chills, SOB, calf pain.\par \par Interval hx (9/16/20): 3 months postop. Saw hematologist yesterday and will continue Eliquis for 3 more months. Reports occasional bleeding from abdominal wound. Compliant with silicone sheeting.\par \par Interval hx (10/8/20). Patient presents today for f/u c/o intermittent serous drainage from the umbilicus. Denies any fever, chills or purulence.

## 2020-10-08 NOTE — PHYSICAL EXAM
[de-identified] : well-appearing, NAD [de-identified] : BL breast with excellent shape and volume. incisions healing well with early hypertropic scarring to IMF scars; no palpable fluid collection or erythema [de-identified] : soft, nontender, no palpable fluid wave, low transverse incision C/D/i, umbilicus with 3mm granuloma superiorly with minor drainage, no open wounds

## 2020-10-08 NOTE — ASSESSMENT
[FreeTextEntry1] : 35F with post-PE on Eliquis s/p breast reduction and lift with abdominoplasty on 6/3/20 in [place], [place] (Dr. Segundo). No cellulitis or palpable fluid collection.  Healing umbilical wound with small granuloma\par \par -silver nitrate applied to umbilicus\par -continue daily Aquaphor to umbilicus\par -c/w scaraway silicone sheeting\par -c/w eliquis AC per hematologist\par -counseled follow up with plastic surgeon in [place]\par -follow up PRN

## 2020-11-09 ENCOUNTER — EMERGENCY (EMERGENCY)
Facility: HOSPITAL | Age: 35
LOS: 0 days | Discharge: HOME | End: 2020-11-09
Attending: EMERGENCY MEDICINE | Admitting: EMERGENCY MEDICINE
Payer: COMMERCIAL

## 2020-11-09 VITALS
HEART RATE: 64 BPM | SYSTOLIC BLOOD PRESSURE: 115 MMHG | OXYGEN SATURATION: 100 % | TEMPERATURE: 99 F | DIASTOLIC BLOOD PRESSURE: 72 MMHG | WEIGHT: 194.01 LBS | HEIGHT: 62 IN | RESPIRATION RATE: 18 BRPM

## 2020-11-09 DIAGNOSIS — Z98.891 HISTORY OF UTERINE SCAR FROM PREVIOUS SURGERY: Chronic | ICD-10-CM

## 2020-11-09 DIAGNOSIS — R07.89 OTHER CHEST PAIN: ICD-10-CM

## 2020-11-09 DIAGNOSIS — Z98.891 HISTORY OF UTERINE SCAR FROM PREVIOUS SURGERY: ICD-10-CM

## 2020-11-09 DIAGNOSIS — R07.9 CHEST PAIN, UNSPECIFIED: ICD-10-CM

## 2020-11-09 LAB
ALBUMIN SERPL ELPH-MCNC: 4 G/DL — SIGNIFICANT CHANGE UP (ref 3.5–5.2)
ALP SERPL-CCNC: 30 U/L — SIGNIFICANT CHANGE UP (ref 30–115)
ALT FLD-CCNC: 11 U/L — SIGNIFICANT CHANGE UP (ref 0–41)
ANION GAP SERPL CALC-SCNC: 9 MMOL/L — SIGNIFICANT CHANGE UP (ref 7–14)
AST SERPL-CCNC: 33 U/L — SIGNIFICANT CHANGE UP (ref 0–41)
BASOPHILS # BLD AUTO: 0.01 K/UL — SIGNIFICANT CHANGE UP (ref 0–0.2)
BASOPHILS NFR BLD AUTO: 0.2 % — SIGNIFICANT CHANGE UP (ref 0–1)
BILIRUB SERPL-MCNC: <0.2 MG/DL — SIGNIFICANT CHANGE UP (ref 0.2–1.2)
BUN SERPL-MCNC: 14 MG/DL — SIGNIFICANT CHANGE UP (ref 10–20)
CALCIUM SERPL-MCNC: 9 MG/DL — SIGNIFICANT CHANGE UP (ref 8.5–10.1)
CHLORIDE SERPL-SCNC: 105 MMOL/L — SIGNIFICANT CHANGE UP (ref 98–110)
CO2 SERPL-SCNC: 23 MMOL/L — SIGNIFICANT CHANGE UP (ref 17–32)
CREAT SERPL-MCNC: 0.8 MG/DL — SIGNIFICANT CHANGE UP (ref 0.7–1.5)
EOSINOPHIL # BLD AUTO: 0.06 K/UL — SIGNIFICANT CHANGE UP (ref 0–0.7)
EOSINOPHIL NFR BLD AUTO: 1.3 % — SIGNIFICANT CHANGE UP (ref 0–8)
GLUCOSE SERPL-MCNC: 99 MG/DL — SIGNIFICANT CHANGE UP (ref 70–99)
HCG SERPL-ACNC: <0.6 MIU/ML — SIGNIFICANT CHANGE UP
HCT VFR BLD CALC: 35.1 % — LOW (ref 37–47)
HGB BLD-MCNC: 11.2 G/DL — LOW (ref 12–16)
IMM GRANULOCYTES NFR BLD AUTO: 0.2 % — SIGNIFICANT CHANGE UP (ref 0.1–0.3)
LYMPHOCYTES # BLD AUTO: 2.03 K/UL — SIGNIFICANT CHANGE UP (ref 1.2–3.4)
LYMPHOCYTES # BLD AUTO: 44.1 % — SIGNIFICANT CHANGE UP (ref 20.5–51.1)
MCHC RBC-ENTMCNC: 30.6 PG — SIGNIFICANT CHANGE UP (ref 27–31)
MCHC RBC-ENTMCNC: 31.9 G/DL — LOW (ref 32–37)
MCV RBC AUTO: 95.9 FL — SIGNIFICANT CHANGE UP (ref 81–99)
MONOCYTES # BLD AUTO: 0.37 K/UL — SIGNIFICANT CHANGE UP (ref 0.1–0.6)
MONOCYTES NFR BLD AUTO: 8 % — SIGNIFICANT CHANGE UP (ref 1.7–9.3)
NEUTROPHILS # BLD AUTO: 2.12 K/UL — SIGNIFICANT CHANGE UP (ref 1.4–6.5)
NEUTROPHILS NFR BLD AUTO: 46.2 % — SIGNIFICANT CHANGE UP (ref 42.2–75.2)
NRBC # BLD: 0 /100 WBCS — SIGNIFICANT CHANGE UP (ref 0–0)
PLATELET # BLD AUTO: 221 K/UL — SIGNIFICANT CHANGE UP (ref 130–400)
POTASSIUM SERPL-MCNC: 4.7 MMOL/L — SIGNIFICANT CHANGE UP (ref 3.5–5)
POTASSIUM SERPL-MCNC: 6.3 MMOL/L — CRITICAL HIGH (ref 3.5–5)
POTASSIUM SERPL-SCNC: 4.7 MMOL/L — SIGNIFICANT CHANGE UP (ref 3.5–5)
POTASSIUM SERPL-SCNC: 6.3 MMOL/L — CRITICAL HIGH (ref 3.5–5)
PROT SERPL-MCNC: 7.2 G/DL — SIGNIFICANT CHANGE UP (ref 6–8)
RBC # BLD: 3.66 M/UL — LOW (ref 4.2–5.4)
RBC # FLD: 13.8 % — SIGNIFICANT CHANGE UP (ref 11.5–14.5)
SODIUM SERPL-SCNC: 137 MMOL/L — SIGNIFICANT CHANGE UP (ref 135–146)
TROPONIN T SERPL-MCNC: <0.01 NG/ML — SIGNIFICANT CHANGE UP
WBC # BLD: 4.6 K/UL — LOW (ref 4.8–10.8)
WBC # FLD AUTO: 4.6 K/UL — LOW (ref 4.8–10.8)

## 2020-11-09 PROCEDURE — 76937 US GUIDE VASCULAR ACCESS: CPT | Mod: 26

## 2020-11-09 PROCEDURE — 71275 CT ANGIOGRAPHY CHEST: CPT | Mod: 26

## 2020-11-09 PROCEDURE — 99285 EMERGENCY DEPT VISIT HI MDM: CPT | Mod: 25

## 2020-11-09 PROCEDURE — 93308 TTE F-UP OR LMTD: CPT | Mod: 26

## 2020-11-09 PROCEDURE — 36000 PLACE NEEDLE IN VEIN: CPT | Mod: 59

## 2020-11-09 PROCEDURE — 93010 ELECTROCARDIOGRAM REPORT: CPT

## 2020-11-09 PROCEDURE — 71045 X-RAY EXAM CHEST 1 VIEW: CPT | Mod: 26

## 2020-11-09 NOTE — ED PROVIDER NOTE - CLINICAL SUMMARY MEDICAL DECISION MAKING FREE TEXT BOX
pt with history of provoked PE currently still on Eliquis presenting with CP x2d, similar to pain from PE. no LE edema/swelling. No new DVT/PE risk factors. +palps. No SOB/OWENS, fevers/chills. Well appearing, NAD, non toxic. NCAT PERRLA EOMI neck supple non tender normal wob cta bl rrr abdomen s nt nd no rebound no guarding WWPx4 neuro non focal. labs ekg imaging reviewed pt with history of provoked PE currently still on Eliquis presenting with CP x2d, similar to pain from PE. no LE edema/swelling. No new DVT/PE risk factors. +palps. No SOB/OWENS, fevers/chills. Well appearing, NAD, non toxic. NCAT PERRLA EOMI neck supple non tender normal wob cta bl rrr abdomen s nt nd no rebound no guarding WWPx4 neuro non focal. labs ekg imaging reviewed. Aware of all results, given a copy of all available results, comfortable with discharge and follow-up outpatient, strict return precautions given. Endorses understanding of all of this and aware that they can return at any time for new or concerning symptoms. No further questions or concerns at this time

## 2020-11-09 NOTE — ED PROCEDURE NOTE - ATTENDING CONTRIBUTION TO CARE
Procedure preformed with attending supervision.   I was present for and supervised the key and critical aspects of the procedures preformed during the care of the patient.
Procedure preformed with attending supervision.   I was present for and supervised the key and critical aspects of the procedures preformed during the care of the patient.

## 2020-11-09 NOTE — ED ADULT NURSE NOTE - OBJECTIVE STATEMENT
pt A&ox3, pt states midsternal CP since yesterday, pt states she had a history of PE in past, cardiac monitor and pulse ox initiated, no s/s of acute distress at this time

## 2020-11-09 NOTE — ED PROVIDER NOTE - OBJECTIVE STATEMENT
36 y/o female with hx PE on Eliquis presents to the ED c/o "I have mid chest tightness since last night non-radiating. It felt worse this morning. I don't feel hard to breathe." no cough/ fever/ chills/ weakness/ leg pain/ leg swelling

## 2020-11-09 NOTE — ED ADULT NURSE NOTE - CAS DISCH CONDITION
Progress Note - Noe Lin 7/3/1932, 79 y o  female MRN: [id number]    Unit/Bed#:  Encounter: [id number]    Primary Care Provider: Benny Franco DO   Date and time admitted to hospital: 3/20/2019  9:18 AM        * Acute metabolic encephalopathy  Assessment & Plan  Acute metabolic encephalopathy was present on admission, this is secondary to acute bilateral frontal lobe CVA  Due to multi territorial infarct, suspected embolic CVA, IV heparin was initiated yesterday evening  Continue to monitor telemetry  Patient currently in atrial fibrillation, follow-up echocardiogram   Continue IV heparin  Consult speech therapy  Acute kidney injury [facility])  Assessment & Plan  Patient underlying chronic kidney disease stage 3, acute kidney injury has resolved  Avoid nephrotoxins  Acute embolic stroke [facility])  Assessment & Plan  Continue Aspirin, patient was previously prescribed Eliquis will need to clarify with family whether or not she was compliant    Hypernatremia  Assessment & Plan  Change IV fluid to D5W  Follow up daily labs  CKD (chronic kidney disease) stage 3, GFR 30-59 ml/min ([facility])  Assessment & Plan  Avoid further nephrotoxins  Restless leg syndrome  Assessment & Plan  Will resume p o  Medications if patient is cleared by speech    NSTEMI (non-ST elevated myocardial infarction) [facility])  Assessment & Plan  Mild troponin elevation, likely secondary to acute kidney injury and traumatic rhabdomyolysis    Non ST elevated myocardial infarction type 2 secondary to rhabdomyolysis  Traumatic rhabdomyolysis [facility])  Assessment & Plan  Continue IV fluid, follow up daily CPK  VTE Pharmacologic Prophylaxis:   Pharmacologic: Heparin Drip  Mechanical VTE Prophylaxis in Place: Yes    Patient Centered Rounds: I have performed bedside rounds with nursing staff today      Discussions with Specialists or Other Care Team Provider:  Plan of care discussed with respiratory care team, plan of care discussed with nursing staff and with physical therapy  Education and Discussions with Family / Patient:  Patient's family was updated at bedside    Time Spent for Care: 1 hour  More than 50% of total time spent on counseling and coordination of care as described above  Current Length of Stay: 1 day(s)    Current Patient Status: Inpatient   Certification Statement: The patient will continue to require additional inpatient hospital stay due to Acute cerebrovascular accident        Code Status: Level 3 - DNAR and DNI      Subjective:   No pain, patient has no acute complaints, is markedly more interactive today    Objective:     Vitals:   Temp (24hrs), Av 3 °F (37 4 °C), Min:98 2 °F (36 8 °C), Max:100 5 °F (38 1 °C)    Temp:  [98 2 °F (36 8 °C)-100 5 °F (38 1 °C)] 100 5 °F (38 1 °C)  HR:  [] 95  Resp:  [13-41] 17  BP: (110-179)/(49-94) 144/65  SpO2:  [89 %-98 %] 92 %  Body mass index is 26 49 kg/m²  Input and Output Summary (last 24 hours): Intake/Output Summary (Last 24 hours) at 3/21/2019 0906  Last data filed at 3/21/2019 2255  Gross per 24 hour   Intake 1321 23 ml   Output 1700 ml   Net -378 77 ml       Physical Exam:     Physical Exam   Constitutional: She appears well-developed and well-nourished  No distress  HENT:   Head: Normocephalic and atraumatic  Right Ear: External ear normal    Left Ear: External ear normal    Cardiovascular: Normal rate, regular rhythm, normal heart sounds and intact distal pulses  Exam reveals no friction rub  No murmur heard  Pulmonary/Chest: Effort normal and breath sounds normal  No stridor  No respiratory distress  Neurological: She is alert  No cranial nerve deficit  GCS eye subscore is 4  GCS verbal subscore is 5  GCS motor subscore is 6  Skin: She is not diaphoretic           Additional Data:     Labs:    Results from last 7 days   Lab Units 19  0451  19  1013   WBC Thousand/uL 8 19   < > 8 56   HEMOGLOBIN g/dL 11 7   < > 12 3   HEMATOCRIT % 37 0   < > 38 7   PLATELETS Thousands/uL 197   < > 206   NEUTROS PCT %  --   --  71   LYMPHS PCT %  --   --  18   MONOS PCT %  --   --  10   EOS PCT %  --   --  0    < > = values in this interval not displayed  Results from last 7 days   Lab Units 03/21/19  0451   POTASSIUM mmol/L 3 6   CHLORIDE mmol/L 112*   CO2 mmol/L 26   BUN mg/dL 35*   CREATININE mg/dL 1 53*   CALCIUM mg/dL 8 5   ALK PHOS U/L 99   ALT U/L 40   AST U/L 57*     Results from last 7 days   Lab Units 03/20/19  2143   INR  1 17       * I Have Reviewed All Lab Data Listed Above  * Additional Pertinent Lab Tests Reviewed: Tree Watkins Admission Reviewed      MRI of the brain  Impression:     Bifrontal lobe regions of acute to subacute ischemia/infarct could be from embolic etiology  Volume loss/atrophy and microangiopathy           Recent Cultures (last 7 days):           Last 24 Hours Medication List:     Current Facility-Administered Medications:  aspirin 300 mg Rectal Daily Noel Gamez DO    dextrose 75 mL/hr Intravenous Continuous Noel Gamez DO Last Rate: 75 mL/hr (03/21/19 0811)   heparin (porcine) 3-20 Units/kg/hr (Order-Specific) Intravenous Titrated GRISELDA Villaseñor Last Rate: 9 Units/kg/hr (03/21/19 0641)   melatonin 3 mg Oral HS Noel Gamez DO         Today, Patient Was Seen By: Noel Gamez DO Stable

## 2020-11-09 NOTE — ED PROVIDER NOTE - PATIENT PORTAL LINK FT
You can access the FollowMyHealth Patient Portal offered by [facility] [place] by registering at the following website: [email or web address]. By joining [employer]’s FollowMyHealth portal, you will also be able to view your health information using other applications (apps) compatible with our system.

## 2020-12-28 ENCOUNTER — LABORATORY RESULT (OUTPATIENT)
Age: 35
End: 2020-12-28

## 2020-12-28 ENCOUNTER — APPOINTMENT (OUTPATIENT)
Dept: HEMATOLOGY ONCOLOGY | Facility: CLINIC | Age: 35
End: 2020-12-28
Payer: COMMERCIAL

## 2020-12-28 VITALS
BODY MASS INDEX: 38.95 KG/M2 | RESPIRATION RATE: 14 BRPM | DIASTOLIC BLOOD PRESSURE: 78 MMHG | HEART RATE: 74 BPM | TEMPERATURE: 97.6 F | SYSTOLIC BLOOD PRESSURE: 115 MMHG | HEIGHT: 61.5 IN | WEIGHT: 209 LBS

## 2020-12-28 PROBLEM — I26.99 OTHER PULMONARY EMBOLISM WITHOUT ACUTE COR PULMONALE: Chronic | Status: ACTIVE | Noted: 2020-11-09

## 2020-12-28 PROCEDURE — 99213 OFFICE O/P EST LOW 20 MIN: CPT

## 2020-12-29 LAB
FERRITIN SERPL-MCNC: 14 NG/ML
HCT VFR BLD CALC: 35.3 %
HGB BLD-MCNC: 11.6 G/DL
IRON SATN MFR SERPL: 12 %
IRON SERPL-MCNC: 57 UG/DL
MCHC RBC-ENTMCNC: 31.4 PG
MCHC RBC-ENTMCNC: 32.9 G/DL
MCV RBC AUTO: 95.7 FL
PLATELET # BLD AUTO: 219 K/UL
PMV BLD: 10.3 FL
RBC # BLD: 3.69 M/UL
RBC # FLD: 13.4 %
TIBC SERPL-MCNC: 465 UG/DL
UIBC SERPL-MCNC: 408 UG/DL
WBC # FLD AUTO: 5.16 K/UL

## 2020-12-30 NOTE — REVIEW OF SYSTEMS
[Negative] : Heme/Lymph [Fever] : no fever [Chills] : no chills [Chest Pain] : no chest pain [Palpitations] : no palpitations [Easy Bleeding] : no tendency for easy bleeding [FreeTextEntry6] : denies SOB

## 2020-12-30 NOTE — ASSESSMENT
[FreeTextEntry1] : 35 year old female with recent surgery in [place] on 6/3/2020 for breast reduction with abdominoplasty. She later developed abdominal abscess in wich CT revealed incidental RLL PE with RV strain. \par \par # PE\par - Likely  intermediate risk of recurrence ; even in light of recent surgery and travel patient states she was frequently ambulating but anaesthesia was more than 30 minutes\par - she is s/p total of  6  months of Eliquis 5mg BID , she just had a new refill so we decided to finish it and stop\par - will check D dimer one month after she is off Eliquis if over 500 will resume AC if agrees, her preference is important as well\par - LE duplex negative \par - D-dimer was negative in 9/2020\par \par # Normocytic Anemia due to FRANK from menstrual blood loss\par - started oral iron every other day and re check in 2-3 month\par \par RTC will call with D dimer once its back

## 2020-12-30 NOTE — PHYSICAL EXAM
[Fully active, able to carry on all pre-disease performance without restriction] : Status 0 - Fully active, able to carry on all pre-disease performance without restriction [Normal] : normoactive bowel sounds, soft and nontender, no hepatosplenomegaly or masses appreciated [de-identified] : abdominal incision and breast incision- dressing reportedly C/D/I; she is wearing dress today

## 2020-12-30 NOTE — HISTORY OF PRESENT ILLNESS
[de-identified] : 35 year old female here today for evaluation and management of new PE.  She was not seen in [facility] this is a new consult  at the request of the medicine team. \par \par Trudi is a 36 yo female of  decent who has no significant PMH and PSHx including recent breast reduction, gastric sleeve, and . \par She had a surgery in [place] on 6/3/2020 when she underwent a breast reduction and lift with abdominoplasty. She was admitted to the ED on 2020 with complaints of surgical site infection. CT C/A/P showed no discrete abdominal wall collection with an incidental  PE finding. CTA chest was done showing right lower lobar pulmonary embolism with evidence of right heart strain. She was started on Heparin inpatient and was bridged to Eliquis. She is currently on Eliquis 5mg BID. Patient denies any OWENS, cough, chest pain, SOB at rest, LE swelling at this time. \par \par B/L LE Duplex 2020\par  Impression:\par No evidence of deep venous thrombosis in the bilateral lower extremities.\par \par CT Angio Chest 2020 \par PULMONARY EMBOLUS: Right lower lobar pulmonary embolism extending into segmental branches. Evidence of right heart strain with RV:LV ratio of 1.0.\par IMPRESSION:\par Right lower lobar pulmonary embolism with evidence of right heart strain.\par \par This is her first VTE event. No OCP use. Never a smoker. She has three children all  deliveries. She has no personal or family history of clotting disorders. She did recently travel from [place] but stated she ambulated frequently during her travels and post op. She was diagnosed with COVID-19 in 2020; with complaints of loss of smell, fever, and weakness. Since PE developed after three months from diagnosis it is unlikely that it is related to the virus and its side effects. In addition, she was found to have a history of mild anemia. She is not currently on any iron supplements. She has heavy menses lasting about 5 days. She noted that after starting the eliquis she has developed a mild increase in bleeding and clotting. \par \par  [de-identified] : 9/15/2020\par Patient is here for a follow-up visit for hx of PE.  She is feeling well with no new complaints.  Patient denies fever, chills, worsening of fatigue/SOB, pica, lightheadedness or bleeding.  She does report her menstrual cycle is slightly heavier since being on anticoagulation.  She is anticipating followup with PULM, Dr. Talamantes at the end of the month.  She has been tolerating Eliquis BiD for about 3 months now, since 06/2020.\par \par 12/28/20\par She is here for follow up. She had CTA done in 11/2020 for chest pain:\par IMPRESSION:\par 1.  Since June 18, 2020, no evidence of acute pulmonary embolus or other acute intrathoracic pathology; interval resolution of right lower lobe pulmonary emboli since 6/18/2020.\par \par D dimetr last visit was negative \par \par \par She feels fine.  She just had a refil for her DAOC. We decided to finish this month. \par \par She contineus to have heavy menses so I asked to to start oral iron.

## 2021-02-08 ENCOUNTER — APPOINTMENT (OUTPATIENT)
Dept: PLASTIC SURGERY | Facility: CLINIC | Age: 36
End: 2021-02-08
Payer: COMMERCIAL

## 2021-02-08 DIAGNOSIS — T81.89XA OTHER COMPLICATIONS OF PROCEDURES, NOT ELSEWHERE CLASSIFIED, INITIAL ENCOUNTER: ICD-10-CM

## 2021-02-08 PROCEDURE — 99072 ADDL SUPL MATRL&STAF TM PHE: CPT

## 2021-02-08 PROCEDURE — 99212 OFFICE O/P EST SF 10 MIN: CPT

## 2021-02-08 NOTE — PHYSICAL EXAM
[de-identified] : well-appearing, NAD [de-identified] : BL breast with excellent shape and volume. incisions healing well with hypertropic scarring to IMF incisions; no palpable fluid collection or erythema [de-identified] : soft, nontender, no palpable fluid wave, low transverse incision well-healed, umbilicus with 5 mm granulation tissue with drainage of non-malodorous fat necrosis fluid, 1.5 cm sinus track, mechanical debridement performed and packed with gauze

## 2021-02-08 NOTE — ASSESSMENT
[FreeTextEntry1] : 35F with post-PE on Eliquis s/p breast reduction and lift with abdominoplasty on 6/3/20 in [place], [place] (Dr. Segundo). No cellulitis or palpable fluid collection.  Healing umbilical wound with granuloma and sinus track, no signs of infection\par \par -continue daily Aquaphor to umbilicus\par -daily packing change; demonstrated with patient\par -counseled follow up with plastic surgeon in [place]\par -all questions were answered\par -doxycycline rx given\par -follow up in 3 weeks for umbilicus wound check\par \par Due to COVID-19, pre-visit patient instructions were explained to the patient and their symptoms were checked upon arrival. Masks were used by the healthcare provider and staff and the examination room was cleaned after the patient visit concluded\par \par

## 2021-02-08 NOTE — HISTORY OF PRESENT ILLNESS
[FreeTextEntry1] : 34 yo F s/p BBR and lipoabdominoplasty and SAL BL flank and back on 6/3/20 in [place] (Dr. Segundo), who reports PTA 2 days of drainage from abdominoplasty incision near mons. Drainage was described as "clear yellow with red stain" with no foul odor. She also reported similar finding from umbilicus. She had concern regarding umbilical wound is open. She denies fevers, chills, SOB, CP, abdominal pain, abdominal\par redness. This report is differs from prior history given elsewhere. She reports compliance with surgical bra and compression garmennt/abdominal binder.  She was admitted for PE RLL and started on heparin drip AC then bridged to Eliquis. \par \par Plastic Surgery was consulted for wound drainage, r/o post-op infection.  CT a/p demonstrated likely post-op small seroma collections mikey-umbilical BL.  BL needle aspiration of fluid demonstrated right turbid hematoma fluid and left serous fluid; they were sent for cultures.  there was no external signs of cellulitis or abscess.  She has an open umbilical wound without purulent drainage.  ID consult recommended Augmentin and Bactrim x 2 weeks.  Discharged from hospital 6/20/20\par \par Here for post-hospitalization follow up.  Compliant with compression garments and surgical bra as well as umbilical LWC with bacitracin/gauze.  Denies fevers, chills, redness, purulent drainage, SOB, CP.\par \par Interval hx (7/10/20): Pt presents for follow up. Denies f/c, CP/SOB, calf pain, breast/abdominal pain, or drainage. Reports occasional chest pressure. Taking Eliquis as prescribed. Completed PO abx. \par \par Interval hx (8/7/20): 2 month s/p BBR and lipoabdominoplasty ([place]).  Went to pulmonologist, who recommend hypercoagulability w/u in 3 months. She reports daily aqupahor to umbilicus wound, which is now smaller.  compliant with silicone sheeting to all incisions.  Inquiring about final appearance of abdomen.  Denies fevers, chills, SOB, calf pain.\par \par Interval hx (9/16/20): 3 months postop. Saw hematologist yesterday and will continue Eliquis for 3 more months. Reports occasional bleeding from abdominal wound. Compliant with silicone sheeting.\par \par Interval hx (10/8/20). Patient presents today for f/u c/o intermittent serous drainage from the umbilicus. Denies any fever, chills or purulence. \par \par interval hx (2/8/21):  Here today for c/o appearance of umbilicus.  She reports having telephone follow-up with Plastic Surgeon in [place].  Denies fevers, chills.  She reports drainage from umbilicus; reports LWC with aquaphor dressing changes daily.

## 2021-03-01 ENCOUNTER — APPOINTMENT (OUTPATIENT)
Dept: PLASTIC SURGERY | Facility: CLINIC | Age: 36
End: 2021-03-01
Payer: COMMERCIAL

## 2021-03-01 PROCEDURE — 99072 ADDL SUPL MATRL&STAF TM PHE: CPT

## 2021-03-01 PROCEDURE — 99212 OFFICE O/P EST SF 10 MIN: CPT

## 2021-03-01 NOTE — PHYSICAL EXAM
[de-identified] : well-appearing, NAD [de-identified] : BL breast with excellent shape and volume. incisions healing well with hypertropic scarring to IMF incisions; no palpable fluid collection or erythema [de-identified] : soft, nontender, no palpable fluid wave, low transverse incision well-healed, umbilicus with 5 mm granulation tissue with drainage of non-malodorous fat necrosis fluid, 3 cm sinus track, mechanical debridement performed and packed with gauze

## 2021-03-01 NOTE — ASSESSMENT
[FreeTextEntry1] : 35F with post-PE on Eliquis s/p breast reduction and lift with abdominoplasty on 6/3/20 in [place], [place] (Dr. Segundo). No cellulitis or palpable fluid collection.  Healing umbilical wound with granuloma and sinus tract, no signs of infection\par \par -continue daily Aquaphor to umbilicus\par -twice daily packing change; demonstrated with patient\par -counseled follow up with plastic surgeon in [place] when able\par -all questions were answered\par -follow up in 1 month\par \par Due to COVID-19, pre-visit patient instructions were explained to the patient and their symptoms were checked upon arrival. Masks were used by the healthcare provider and staff and the examination room was cleaned after the patient visit concluded\par \par

## 2021-03-01 NOTE — HISTORY OF PRESENT ILLNESS
[FreeTextEntry1] : 34 yo F s/p BBR and lipoabdominoplasty and SAL BL flank and back on 6/3/20 in [place] (Dr. Segundo), who reports PTA 2 days of drainage from abdominoplasty incision near mons. Drainage was described as "clear yellow with red stain" with no foul odor. She also reported similar finding from umbilicus. She had concern regarding umbilical wound is open. She denies fevers, chills, SOB, CP, abdominal pain, abdominal\par redness. This report is differs from prior history given elsewhere. She reports compliance with surgical bra and compression garmennt/abdominal binder.  She was admitted for PE RLL and started on heparin drip AC then bridged to Eliquis. \par \par Plastic Surgery was consulted for wound drainage, r/o post-op infection.  CT a/p demonstrated likely post-op small seroma collections mikey-umbilical BL.  BL needle aspiration of fluid demonstrated right turbid hematoma fluid and left serous fluid; they were sent for cultures.  there was no external signs of cellulitis or abscess.  She has an open umbilical wound without purulent drainage.  ID consult recommended Augmentin and Bactrim x 2 weeks.  Discharged from hospital 6/20/20\par \par Here for post-hospitalization follow up.  Compliant with compression garments and surgical bra as well as umbilical LWC with bacitracin/gauze.  Denies fevers, chills, redness, purulent drainage, SOB, CP.\par \par Interval hx (7/10/20): Pt presents for follow up. Denies f/c, CP/SOB, calf pain, breast/abdominal pain, or drainage. Reports occasional chest pressure. Taking Eliquis as prescribed. Completed PO abx. \par \par Interval hx (8/7/20): 2 month s/p BBR and lipoabdominoplasty ([place]).  Went to pulmonologist, who recommend hypercoagulability w/u in 3 months. She reports daily aqupahor to umbilicus wound, which is now smaller.  compliant with silicone sheeting to all incisions.  Inquiring about final appearance of abdomen.  Denies fevers, chills, SOB, calf pain.\par \par Interval hx (9/16/20): 3 months postop. Saw hematologist yesterday and will continue Eliquis for 3 more months. Reports occasional bleeding from abdominal wound. Compliant with silicone sheeting.\par \par Interval hx (10/8/20). Patient presents today for f/u c/o intermittent serous drainage from the umbilicus. Denies any fever, chills or purulence. \par \par interval hx (2/8/21):  Here today for c/o appearance of umbilicus.  She reports having telephone follow-up with Plastic Surgeon in [place].  Denies fevers, chills.  She reports drainage from umbilicus; reports LWC with aquaphor dressing changes daily.\par \par Interval hx (3/1/21):  Here today for umbilical wound check. Reports persistent daily drainage. Packing twice daily as instructed. Denies pain or f/c. Has spoken to plastic surgeon in [place] who recommends continuing current wound care and coming to office when possible.

## 2021-03-22 ENCOUNTER — APPOINTMENT (OUTPATIENT)
Dept: HEMATOLOGY ONCOLOGY | Facility: CLINIC | Age: 36
End: 2021-03-22

## 2021-03-22 ENCOUNTER — OUTPATIENT (OUTPATIENT)
Dept: OUTPATIENT SERVICES | Facility: HOSPITAL | Age: 36
LOS: 1 days | Discharge: HOME | End: 2021-03-22

## 2021-03-22 ENCOUNTER — LABORATORY RESULT (OUTPATIENT)
Age: 36
End: 2021-03-22

## 2021-03-22 DIAGNOSIS — I26.99 OTHER PULMONARY EMBOLISM WITHOUT ACUTE COR PULMONALE: ICD-10-CM

## 2021-03-22 DIAGNOSIS — Z00.00 ENCOUNTER FOR GENERAL ADULT MEDICAL EXAMINATION W/OUT ABNORMAL FINDINGS: ICD-10-CM

## 2021-03-22 DIAGNOSIS — D64.9 ANEMIA, UNSPECIFIED: ICD-10-CM

## 2021-03-22 DIAGNOSIS — Z98.891 HISTORY OF UTERINE SCAR FROM PREVIOUS SURGERY: Chronic | ICD-10-CM

## 2021-03-25 LAB
DEPRECATED D DIMER PPP IA-ACNC: 99 NG/ML DDU
HCT VFR BLD CALC: 37.7 %
HGB BLD-MCNC: 12.5 G/DL
IRON SERPL-MCNC: 83 UG/DL
MCHC RBC-ENTMCNC: 32.1 PG
MCHC RBC-ENTMCNC: 33.2 G/DL
MCV RBC AUTO: 96.7 FL
PLATELET # BLD AUTO: 245 K/UL
PMV BLD: 10.8 FL
RBC # BLD: 3.9 M/UL
RBC # FLD: 12.6 %
WBC # FLD AUTO: 5.87 K/UL

## 2021-03-25 RX ORDER — APIXABAN 5 MG/1
5 TABLET, FILM COATED ORAL
Refills: 0 | Status: COMPLETED | COMMUNITY
End: 2021-03-25

## 2021-04-05 ENCOUNTER — APPOINTMENT (OUTPATIENT)
Dept: PLASTIC SURGERY | Facility: CLINIC | Age: 36
End: 2021-04-05

## 2021-08-10 ENCOUNTER — EMERGENCY (EMERGENCY)
Facility: HOSPITAL | Age: 36
LOS: 0 days | Discharge: HOME | End: 2021-08-10
Attending: EMERGENCY MEDICINE | Admitting: EMERGENCY MEDICINE
Payer: COMMERCIAL

## 2021-08-10 VITALS
SYSTOLIC BLOOD PRESSURE: 109 MMHG | DIASTOLIC BLOOD PRESSURE: 60 MMHG | WEIGHT: 203.93 LBS | OXYGEN SATURATION: 97 % | HEIGHT: 62 IN | RESPIRATION RATE: 17 BRPM | HEART RATE: 88 BPM | TEMPERATURE: 99 F

## 2021-08-10 DIAGNOSIS — Z79.899 OTHER LONG TERM (CURRENT) DRUG THERAPY: ICD-10-CM

## 2021-08-10 DIAGNOSIS — Z98.891 HISTORY OF UTERINE SCAR FROM PREVIOUS SURGERY: Chronic | ICD-10-CM

## 2021-08-10 DIAGNOSIS — R07.89 OTHER CHEST PAIN: ICD-10-CM

## 2021-08-10 DIAGNOSIS — Z86.711 PERSONAL HISTORY OF PULMONARY EMBOLISM: ICD-10-CM

## 2021-08-10 LAB
ANION GAP SERPL CALC-SCNC: 9 MMOL/L — SIGNIFICANT CHANGE UP (ref 7–14)
BASOPHILS # BLD AUTO: 0.03 K/UL — SIGNIFICANT CHANGE UP (ref 0–0.2)
BASOPHILS NFR BLD AUTO: 0.4 % — SIGNIFICANT CHANGE UP (ref 0–1)
BUN SERPL-MCNC: 17 MG/DL — SIGNIFICANT CHANGE UP (ref 10–20)
CALCIUM SERPL-MCNC: 9.6 MG/DL — SIGNIFICANT CHANGE UP (ref 8.5–10.1)
CHLORIDE SERPL-SCNC: 106 MMOL/L — SIGNIFICANT CHANGE UP (ref 98–110)
CO2 SERPL-SCNC: 24 MMOL/L — SIGNIFICANT CHANGE UP (ref 17–32)
CREAT SERPL-MCNC: 0.9 MG/DL — SIGNIFICANT CHANGE UP (ref 0.7–1.5)
EOSINOPHIL # BLD AUTO: 0.15 K/UL — SIGNIFICANT CHANGE UP (ref 0–0.7)
EOSINOPHIL NFR BLD AUTO: 2.1 % — SIGNIFICANT CHANGE UP (ref 0–8)
GLUCOSE SERPL-MCNC: 99 MG/DL — SIGNIFICANT CHANGE UP (ref 70–99)
HCG SERPL-ACNC: <0.6 MIU/ML — SIGNIFICANT CHANGE UP
HCT VFR BLD CALC: 32.8 % — LOW (ref 37–47)
HGB BLD-MCNC: 10.8 G/DL — LOW (ref 12–16)
IMM GRANULOCYTES NFR BLD AUTO: 0.3 % — SIGNIFICANT CHANGE UP (ref 0.1–0.3)
LYMPHOCYTES # BLD AUTO: 2.33 K/UL — SIGNIFICANT CHANGE UP (ref 1.2–3.4)
LYMPHOCYTES # BLD AUTO: 33.1 % — SIGNIFICANT CHANGE UP (ref 20.5–51.1)
MCHC RBC-ENTMCNC: 31.3 PG — HIGH (ref 27–31)
MCHC RBC-ENTMCNC: 32.9 G/DL — SIGNIFICANT CHANGE UP (ref 32–37)
MCV RBC AUTO: 95.1 FL — SIGNIFICANT CHANGE UP (ref 81–99)
MONOCYTES # BLD AUTO: 0.58 K/UL — SIGNIFICANT CHANGE UP (ref 0.1–0.6)
MONOCYTES NFR BLD AUTO: 8.2 % — SIGNIFICANT CHANGE UP (ref 1.7–9.3)
NEUTROPHILS # BLD AUTO: 3.93 K/UL — SIGNIFICANT CHANGE UP (ref 1.4–6.5)
NEUTROPHILS NFR BLD AUTO: 55.9 % — SIGNIFICANT CHANGE UP (ref 42.2–75.2)
NRBC # BLD: 0 /100 WBCS — SIGNIFICANT CHANGE UP (ref 0–0)
NT-PROBNP SERPL-SCNC: 48 PG/ML — SIGNIFICANT CHANGE UP (ref 0–300)
PLATELET # BLD AUTO: 312 K/UL — SIGNIFICANT CHANGE UP (ref 130–400)
POTASSIUM SERPL-MCNC: 4.4 MMOL/L — SIGNIFICANT CHANGE UP (ref 3.5–5)
POTASSIUM SERPL-SCNC: 4.4 MMOL/L — SIGNIFICANT CHANGE UP (ref 3.5–5)
RBC # BLD: 3.45 M/UL — LOW (ref 4.2–5.4)
RBC # FLD: 12.5 % — SIGNIFICANT CHANGE UP (ref 11.5–14.5)
SODIUM SERPL-SCNC: 139 MMOL/L — SIGNIFICANT CHANGE UP (ref 135–146)
TROPONIN T SERPL-MCNC: <0.01 NG/ML — SIGNIFICANT CHANGE UP
WBC # BLD: 7.04 K/UL — SIGNIFICANT CHANGE UP (ref 4.8–10.8)
WBC # FLD AUTO: 7.04 K/UL — SIGNIFICANT CHANGE UP (ref 4.8–10.8)

## 2021-08-10 PROCEDURE — 93010 ELECTROCARDIOGRAM REPORT: CPT

## 2021-08-10 PROCEDURE — 99285 EMERGENCY DEPT VISIT HI MDM: CPT

## 2021-08-10 PROCEDURE — 71045 X-RAY EXAM CHEST 1 VIEW: CPT | Mod: 26

## 2021-08-10 PROCEDURE — 71275 CT ANGIOGRAPHY CHEST: CPT | Mod: 26,MA

## 2021-08-10 NOTE — ED PROVIDER NOTE - ATTENDING CONTRIBUTION TO CARE
37 yo female with PMH previous PE (stopped AC after instructed to do by hematology about 6 mo ago) presents to the ER for midsternal CP, described as pressure since last night. Pt has no N/V/F/Chills/cough/dizziness/palpitations/SOB. Denies smoking, denies hormone use/travel/trauma. Denies FH of medical problems. No hx of cardiac workup recently.  Exam benign. Labs, ekg, xray ordered as well as CTA chest r/o pe given previous hx of it. Workup neg, but recommend pt stay in OBS for serial trop and Stress test in the am. Pt refusing and signing out AMA. Risks explained and pt and her  understand. They will follow up with doctor as outpatient.  Return precautions explained.

## 2021-08-10 NOTE — ED PROVIDER NOTE - PATIENT PORTAL LINK FT
You can access the FollowMyHealth Patient Portal offered by [facility] by registering at the following website: [email or web address]. By joining [employer]’s FollowMyHealth portal, you will also be able to view your health information using other applications (apps) compatible with our system.

## 2021-08-10 NOTE — ED PROVIDER NOTE - PHYSICAL EXAMINATION
CONSTITUTIONAL: Well-appearing; well-nourished; in no apparent distress.   CARDIOVASCULAR: Normal S1, S2; no murmurs, rubs, or gallops.   RESPIRATORY: Normal chest excursion with respiration; breath sounds clear and equal bilaterally; no wheezes, rhonchi, or rales.  SKIN: Normal for age and race; warm; dry; good turgor; no apparent lesions or exudate.   NEURO/PSYCH: A & O x 4; grossly unremarkable. mood and manner are appropriate.

## 2021-08-10 NOTE — ED PROVIDER NOTE - OBJECTIVE STATEMENT
pt presents to ED c/o midsternal CP described as pressure since last night. h/o PE, stopped AC approx 6 months ago after cleared by hematologist. nonsmoker, denies etoh/drug use. no hormone use/recent travel/sick contacts. pain is sharp, nonradiating, moderate. denies exacerbating or relieving factors  Denies fever/chill/HA/dizziness/palpitation/sob/abd pain/n/v/d/ black stool/bloody stool/urinary sxs

## 2021-08-10 NOTE — ED PROVIDER NOTE - NSFOLLOWUPCLINICS_GEN_ALL_ED_FT
[facility] [place]  [facility]  [address], [address]  [place] [address]  Phone: [phone]  Fax: [phone]

## 2021-08-10 NOTE — ED ADULT NURSE NOTE - NSIMPLEMENTINTERV_GEN_ALL_ED
Implemented All Universal Safety Interventions:  [place] to call system. Call bell, personal items and telephone within reach. Instruct patient to call for assistance. Room bathroom lighting operational. Non-slip footwear when patient is off stretcher. Physically safe environment: no spills, clutter or unnecessary equipment. Stretcher in lowest position, wheels locked, appropriate side rails in place.

## 2021-08-17 NOTE — ED PROVIDER NOTE - PR
SORAIDA/CKD  COVID19 PNA  Acute respiratory failure  DM  HTN      -Cr 1.35 on 2017 suggest CKDIII baseline. Cr improving, continue to monitor with daily labs.   -SORAIDA clinically pre-renal. Urine studies suggest pre-renal etiology. However, cannot r/o COVID direct renal injury though less likely.  -Avoid excessive IVF with COVID pulmonary status  -Renal US if Cr begins to worsen  -No indication for RRT at this time       158

## 2021-09-02 ENCOUNTER — APPOINTMENT (OUTPATIENT)
Dept: CARDIOLOGY | Facility: CLINIC | Age: 36
End: 2021-09-02
Payer: COMMERCIAL

## 2021-09-02 VITALS
DIASTOLIC BLOOD PRESSURE: 80 MMHG | TEMPERATURE: 97.3 F | HEIGHT: 61 IN | BODY MASS INDEX: 42.1 KG/M2 | WEIGHT: 223 LBS | HEART RATE: 73 BPM | SYSTOLIC BLOOD PRESSURE: 100 MMHG

## 2021-09-02 DIAGNOSIS — E78.5 HYPERLIPIDEMIA, UNSPECIFIED: ICD-10-CM

## 2021-09-02 PROCEDURE — 93000 ELECTROCARDIOGRAM COMPLETE: CPT

## 2021-09-02 PROCEDURE — 99202 OFFICE O/P NEW SF 15 MIN: CPT

## 2021-09-02 NOTE — HISTORY OF PRESENT ILLNESS
[FreeTextEntry1] :  pt c/o frequent episodes of chest pains mostly at rest\par  no dyspnoea\par  pt has hx of pulmonary emboli 1 yr ago after surgery\par  no hx of htn or diabetes\par

## 2021-09-02 NOTE — ASSESSMENT
[FreeTextEntry1] :  e k g nsr wnl\par  will get echo and stress test\par  pt has atypical chest pains

## 2021-09-02 NOTE — PHYSICAL EXAM
[Well Developed] : well developed [Well Nourished] : well nourished [No Acute Distress] : no acute distress [Normal Conjunctiva] : normal conjunctiva [Normal Venous Pressure] : normal venous pressure [No Carotid Bruit] : no carotid bruit [Normal S1, S2] : normal S1, S2 [No Murmur] : no murmur [No Rub] : no rub [No Gallop] : no gallop [Clear Lung Fields] : clear lung fields [Good Air Entry] : good air entry [No Respiratory Distress] : no respiratory distress  [Soft] : abdomen soft [No Masses/organomegaly] : no masses/organomegaly [Non Tender] : non-tender [Normal Bowel Sounds] : normal bowel sounds [Normal Gait] : normal gait [No Edema] : no edema [No Cyanosis] : no cyanosis [No Clubbing] : no clubbing [No Varicosities] : no varicosities [No Rash] : no rash [No Skin Lesions] : no skin lesions [Moves all extremities] : moves all extremities [No Focal Deficits] : no focal deficits [Normal Speech] : normal speech [Alert and Oriented] : alert and oriented [Normal memory] : normal memory [de-identified] : pt has syst mumur at base

## 2021-10-25 ENCOUNTER — APPOINTMENT (OUTPATIENT)
Dept: CARDIOLOGY | Facility: CLINIC | Age: 36
End: 2021-10-25
Payer: COMMERCIAL

## 2021-10-25 DIAGNOSIS — I10 ESSENTIAL (PRIMARY) HYPERTENSION: ICD-10-CM

## 2021-10-25 DIAGNOSIS — R07.89 OTHER CHEST PAIN: ICD-10-CM

## 2021-10-25 PROCEDURE — 93306 TTE W/DOPPLER COMPLETE: CPT

## 2021-10-25 PROCEDURE — XXXXX: CPT

## 2021-10-26 PROBLEM — R07.89 ATYPICAL CHEST PAIN: Status: ACTIVE | Noted: 2021-09-02

## 2021-10-26 PROBLEM — I10 HYPERTENSION: Status: ACTIVE | Noted: 2020-06-28

## 2021-11-04 ENCOUNTER — APPOINTMENT (OUTPATIENT)
Dept: CARDIOLOGY | Facility: CLINIC | Age: 36
End: 2021-11-04

## 2021-12-23 ENCOUNTER — EMERGENCY (EMERGENCY)
Facility: HOSPITAL | Age: 36
LOS: 0 days | Discharge: HOME | End: 2021-12-23
Attending: STUDENT IN AN ORGANIZED HEALTH CARE EDUCATION/TRAINING PROGRAM | Admitting: STUDENT IN AN ORGANIZED HEALTH CARE EDUCATION/TRAINING PROGRAM
Payer: COMMERCIAL

## 2021-12-23 VITALS
SYSTOLIC BLOOD PRESSURE: 117 MMHG | HEART RATE: 82 BPM | OXYGEN SATURATION: 100 % | TEMPERATURE: 98 F | WEIGHT: 210.1 LBS | DIASTOLIC BLOOD PRESSURE: 69 MMHG | RESPIRATION RATE: 18 BRPM | HEIGHT: 62 IN

## 2021-12-23 DIAGNOSIS — M54.6 PAIN IN THORACIC SPINE: ICD-10-CM

## 2021-12-23 DIAGNOSIS — M25.511 PAIN IN RIGHT SHOULDER: ICD-10-CM

## 2021-12-23 DIAGNOSIS — Z98.891 HISTORY OF UTERINE SCAR FROM PREVIOUS SURGERY: Chronic | ICD-10-CM

## 2021-12-23 DIAGNOSIS — M54.9 DORSALGIA, UNSPECIFIED: ICD-10-CM

## 2021-12-23 DIAGNOSIS — Z86.711 PERSONAL HISTORY OF PULMONARY EMBOLISM: ICD-10-CM
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
## 2021-12-23 PROCEDURE — 73060 X-RAY EXAM OF HUMERUS: CPT | Mod: 26,RT

## 2021-12-23 PROCEDURE — 99284 EMERGENCY DEPT VISIT MOD MDM: CPT

## 2021-12-23 PROCEDURE — 73030 X-RAY EXAM OF SHOULDER: CPT | Mod: 26,RT

## 2021-12-23 PROCEDURE — 71046 X-RAY EXAM CHEST 2 VIEWS: CPT | Mod: 26

## 2021-12-23 RX ORDER — KETOROLAC TROMETHAMINE 30 MG/ML
60 SYRINGE (ML) INJECTION ONCE
Refills: 0 | Status: DISCONTINUED | OUTPATIENT
Start: 2021-12-23 | End: 2021-12-23

## 2021-12-23 RX ADMIN — Medication 60 MILLIGRAM(S): at 10:53

## 2021-12-23 NOTE — ED PROVIDER NOTE - PHYSICAL EXAMINATION
CONSTITUTIONAL: Well-developed; well-nourished; in no acute distress.   SKIN: warm, dry  HEAD: Normocephalic; atraumatic.  EYES: no conjunctival injection. PERRL.   ENT: No nasal discharge; airway clear.  NECK: Supple; non tender.  CARD: S1, S2 normal; no murmurs, gallops, or rubs. Regular rate and rhythm.   RESP: No wheezes, rales or rhonchi.  ABD: soft ntnd  EXT: No obvious fx, deformity +R shoulder pain with movement. No midline vertebral ttp, no stepoff. +thoracolumbar b/l paraspinal ttp    LYMPH: No acute cervical adenopathy.  NEURO: Alert, oriented, grossly unremarkable  PSYCH: Cooperative, appropriate.

## 2021-12-23 NOTE — ED ADULT TRIAGE NOTE - CHIEF COMPLAINT QUOTE
Patient stated she had mechanical trip and fall yesterday and presents c/o mid back  right and left shoulder pain. Denies LOC and blood thinners

## 2021-12-23 NOTE — ED PROVIDER NOTE - NSFOLLOWUPINSTRUCTIONS_ED_ALL_ED_FT
Fall Prevention in the Home, Adult  Falls can cause injuries and can affect people from all age groups. There are many simple things that you can do to make your home safe and to help prevent falls. Ask for help when making these changes, if needed.    What actions can I take to prevent falls?  General instructions     Use good lighting in all rooms. Replace any light bulbs that burn out.  Turn on lights if it is dark. Use night-lights.  Place frequently used items in easy-to-reach places. Lower the shelves around your home if necessary.  Set up furniture so that there are clear paths around it. Avoid moving your furniture around.  Remove throw rugs and other tripping hazards from the floor.  Avoid walking on wet floors.  Fix any uneven floor surfaces.  Add color or contrast paint or tape to grab bars and handrails in your home. Place contrasting color strips on the first and last steps of stairways.  When you use a stepladder, make sure that it is completely opened and that the sides are firmly locked. Have someone hold the ladder while you are using it. Do not climb a closed stepladder.  Be aware of any and all pets.  What can I do in the bathroom?     Keep the floor dry. Immediately clean up any water that spills onto the floor.  Remove soap buildup in the tub or shower on a regular basis.  Use non-skid mats or decals on the floor of the tub or shower.  Attach bath mats securely with double-sided, non-slip rug tape.  If you need to sit down while you are in the shower, use a plastic, non-slip stool.  Image ImageInstall grab bars by the toilet and in the tub and shower. Do not use towel bars as grab bars.  What can I do in the bedroom?     Make sure that a bedside light is easy to reach.  Do not use oversized bedding that drapes onto the floor.  Have a firm chair that has side arms to use for getting dressed.  What can I do in the kitchen?     Clean up any spills right away.  If you need to reach for something above you, use a sturdy step stool that has a grab bar.  Keep electrical cables out of the way.  Do not use floor polish or wax that makes floors slippery. If you must use wax, make sure that it is non-skid floor wax.  What can I do in the stairways?     Do not leave any items on the stairs.  Make sure that you have a light switch at the top of the stairs and the bottom of the stairs. Have them installed if you do not have them.  Make sure that there are handrails on both sides of the stairs. Fix handrails that are broken or loose. Make sure that handrails are as long as the stairways.  Install non-slip stair treads on all stairs in your home.  Avoid having throw rugs at the top or bottom of stairways, or secure the rugs with carpet tape to prevent them from moving.  Choose a carpet design that does not hide the edge of steps on the stairway.  Check any carpeting to make sure that it is firmly attached to the stairs. Fix any carpet that is loose or worn.  What can I do on the outside of my home?     Use bright outdoor lighting.  Regularly repair the edges of walkways and driveways and fix any cracks.  Remove high doorway thresholds.  Trim any shrubbery on the main path into your home.  Regularly check that handrails are securely fastened and in good repair. Both sides of any steps should have handrails.  Install guardrails along the edges of any raised decks or porches.  Clear walkways of debris and clutter, including tools and rocks.  Have leaves, snow, and ice cleared regularly.  Use sand or salt on walkways during winter months.  In the garage, clean up any spills right away, including grease or oil spills.  What other actions can I take?     Wear closed-toe shoes that fit well and support your feet. Wear shoes that have rubber soles or low heels.  Use mobility aids as needed, such as canes, walkers, scooters, and crutches.  Review your medicines with your health care provider. Some medicines can cause dizziness or changes in blood pressure, which increase your risk of falling.  Talk with your health care provider about other ways that you can decrease your risk of falls. This may include working with a physical therapist or  to improve your strength, balance, and endurance.    Where to find more information  Centers for Disease Control and Prevention, MIGUEL: https://www.cdc.gov  National [place] on Aging: https://jp0ciwo.neftali.nih.gov  Contact a health care provider if:  You are afraid of falling at home.  You feel weak, drowsy, or dizzy at home.  You fall at home.  Summary  There are many simple things that you can do to make your home safe and to help prevent falls.  Ways to make your home safe include removing tripping hazards and installing grab bars in the bathroom.  Ask for help when making these changes in your home.  This information is not intended to replace advice given to you by your health care provider. Make sure you discuss any questions you have with your health care provider.

## 2021-12-23 NOTE — ED PROVIDER NOTE - NSFOLLOWUPCLINICS_GEN_ALL_ED_FT
[facility] ([facility])  Rehabilitation  [facility] [address], [address] [place] [address]  Phone: [phone]  Fax:

## 2021-12-23 NOTE — ED PROVIDER NOTE - PATIENT PORTAL LINK FT
You can access the FollowMyHealth Patient Portal offered by [facility] by registering at the following website: [email or web address]. By joining [email or web address]’s FollowMyHealth portal, you will also be able to view your health information using other applications (apps) compatible with our system.

## 2021-12-23 NOTE — ED PROVIDER NOTE - CARE PLAN
1 Principal Discharge DX:	Fall  Secondary Diagnosis:	Shoulder pain  Secondary Diagnosis:	Back pain

## 2021-12-23 NOTE — ED PROVIDER NOTE - OBJECTIVE STATEMENT
36y F no pmh presenting s/p fall down 12 stairs yesterday. Mechanical slip and fall. AMbulatory after. Woke up with pain to mid back and R shoulder. No LOC/syncope. No sob/chest pain. No obvious fx, deformity. Pain with movement of R shoulder. Taking 2 500mg tylenol for pain.

## 2021-12-23 NOTE — ED PROVIDER NOTE - ATTENDING CONTRIBUTION TO CARE
35 yo f hx PE not on AC   pt present s/p fall. pt fell down a staircase. pt denies head/neck injury. pt ambulatory after. pt states she felt sore throughout back today so she came for eval. no numbness/weakness/incontinence/retention. no cp/sob/difficulty breathing/pleuritic     vss  gen- NAD, aaox3  card-rrr  lungs-ctab, no wheezing or rhonchi  abd-sntnd, no guarding or rebound  neuro- full str/sensation, cn ii-xii grossly intact, normal coordination and gait  Spine- no midline c/t/l spine ttp, neck supple, FROM to neck, parathoracic tenderness  R shoulder- no ttp on exam, pain w/ rom, motor intact distal    likely msk injury, will get screening xr  possible rotator cuff injury   will provide supportive care, serial exam and ED observation period

## 2022-03-18 NOTE — ED ADULT NURSE NOTE - IS THE PATIENT ABLE TO BE SCREENED?
Group Topic: BH Self-Esteem    Date: 3/17/2022  Start Time: 1915  End Time: 2000  Facilitators: Ekaterina Nunez    Focus: Codependency   Number in attendance: 9    Method: Handout  Attendance: Present  Participation: Moderate  Patient Response: Asked questions  Mood: Anxious  Affect: Type: Depressed   Range: Restricted   Congruency: Incongruent   Stability: Stable  Behavior/Socialization: Appropriate to group  Thought Process: Goal-directed  Task Performance: Needs clarification  Patient Evaluation: Encouragement - needs prompts       Yes

## 2022-04-20 ENCOUNTER — INPATIENT (INPATIENT)
Facility: HOSPITAL | Age: 37
LOS: 1 days | Discharge: HOME | End: 2022-04-22
Attending: STUDENT IN AN ORGANIZED HEALTH CARE EDUCATION/TRAINING PROGRAM | Admitting: STUDENT IN AN ORGANIZED HEALTH CARE EDUCATION/TRAINING PROGRAM
Payer: SELF-PAY

## 2022-04-20 VITALS
SYSTOLIC BLOOD PRESSURE: 143 MMHG | HEART RATE: 98 BPM | WEIGHT: 220.02 LBS | TEMPERATURE: 98 F | RESPIRATION RATE: 18 BRPM | HEIGHT: 62 IN | OXYGEN SATURATION: 98 % | DIASTOLIC BLOOD PRESSURE: 63 MMHG

## 2022-04-20 DIAGNOSIS — I26.99 OTHER PULMONARY EMBOLISM WITHOUT ACUTE COR PULMONALE: ICD-10-CM

## 2022-04-20 DIAGNOSIS — Z98.891 HISTORY OF UTERINE SCAR FROM PREVIOUS SURGERY: Chronic | ICD-10-CM

## 2022-04-20 LAB
ALBUMIN SERPL ELPH-MCNC: 3.9 G/DL — SIGNIFICANT CHANGE UP (ref 3.5–5.2)
ALP SERPL-CCNC: 52 U/L — SIGNIFICANT CHANGE UP (ref 30–115)
ALT FLD-CCNC: 24 U/L — SIGNIFICANT CHANGE UP (ref 0–41)
ANION GAP SERPL CALC-SCNC: 12 MMOL/L — SIGNIFICANT CHANGE UP (ref 7–14)
AST SERPL-CCNC: 17 U/L — SIGNIFICANT CHANGE UP (ref 0–41)
BASOPHILS # BLD AUTO: 0.02 K/UL — SIGNIFICANT CHANGE UP (ref 0–0.2)
BASOPHILS NFR BLD AUTO: 0.5 % — SIGNIFICANT CHANGE UP (ref 0–1)
BILIRUB SERPL-MCNC: <0.2 MG/DL — SIGNIFICANT CHANGE UP (ref 0.2–1.2)
BUN SERPL-MCNC: 12 MG/DL — SIGNIFICANT CHANGE UP (ref 10–20)
CALCIUM SERPL-MCNC: 9.3 MG/DL — SIGNIFICANT CHANGE UP (ref 8.5–10.1)
CHLORIDE SERPL-SCNC: 102 MMOL/L — SIGNIFICANT CHANGE UP (ref 98–110)
CO2 SERPL-SCNC: 24 MMOL/L — SIGNIFICANT CHANGE UP (ref 17–32)
CREAT SERPL-MCNC: 0.7 MG/DL — SIGNIFICANT CHANGE UP (ref 0.7–1.5)
EGFR: 113 ML/MIN/1.73M2 — SIGNIFICANT CHANGE UP
EOSINOPHIL # BLD AUTO: 0.13 K/UL — SIGNIFICANT CHANGE UP (ref 0–0.7)
EOSINOPHIL NFR BLD AUTO: 3.2 % — SIGNIFICANT CHANGE UP (ref 0–8)
GLUCOSE SERPL-MCNC: 86 MG/DL — SIGNIFICANT CHANGE UP (ref 70–99)
HCG SERPL QL: NEGATIVE — SIGNIFICANT CHANGE UP
HCT VFR BLD CALC: 29.6 % — LOW (ref 37–47)
HGB BLD-MCNC: 9.5 G/DL — LOW (ref 12–16)
IMM GRANULOCYTES NFR BLD AUTO: 0.2 % — SIGNIFICANT CHANGE UP (ref 0.1–0.3)
LYMPHOCYTES # BLD AUTO: 1.01 K/UL — LOW (ref 1.2–3.4)
LYMPHOCYTES # BLD AUTO: 24.6 % — SIGNIFICANT CHANGE UP (ref 20.5–51.1)
MCHC RBC-ENTMCNC: 31.5 PG — HIGH (ref 27–31)
MCHC RBC-ENTMCNC: 32.1 G/DL — SIGNIFICANT CHANGE UP (ref 32–37)
MCV RBC AUTO: 98 FL — SIGNIFICANT CHANGE UP (ref 81–99)
MONOCYTES # BLD AUTO: 0.48 K/UL — SIGNIFICANT CHANGE UP (ref 0.1–0.6)
MONOCYTES NFR BLD AUTO: 11.7 % — HIGH (ref 1.7–9.3)
NEUTROPHILS # BLD AUTO: 2.46 K/UL — SIGNIFICANT CHANGE UP (ref 1.4–6.5)
NEUTROPHILS NFR BLD AUTO: 59.8 % — SIGNIFICANT CHANGE UP (ref 42.2–75.2)
NRBC # BLD: 0 /100 WBCS — SIGNIFICANT CHANGE UP (ref 0–0)
NT-PROBNP SERPL-SCNC: 17 PG/ML — SIGNIFICANT CHANGE UP (ref 0–300)
PLATELET # BLD AUTO: 317 K/UL — SIGNIFICANT CHANGE UP (ref 130–400)
POTASSIUM SERPL-MCNC: 4.5 MMOL/L — SIGNIFICANT CHANGE UP (ref 3.5–5)
POTASSIUM SERPL-SCNC: 4.5 MMOL/L — SIGNIFICANT CHANGE UP (ref 3.5–5)
PROT SERPL-MCNC: 6.5 G/DL — SIGNIFICANT CHANGE UP (ref 6–8)
RBC # BLD: 3.02 M/UL — LOW (ref 4.2–5.4)
RBC # FLD: 14.5 % — SIGNIFICANT CHANGE UP (ref 11.5–14.5)
SODIUM SERPL-SCNC: 138 MMOL/L — SIGNIFICANT CHANGE UP (ref 135–146)
TROPONIN T SERPL-MCNC: <0.01 NG/ML — SIGNIFICANT CHANGE UP
TROPONIN T SERPL-MCNC: <0.01 NG/ML — SIGNIFICANT CHANGE UP
WBC # BLD: 4.11 K/UL — LOW (ref 4.8–10.8)
WBC # FLD AUTO: 4.11 K/UL — LOW (ref 4.8–10.8)

## 2022-04-20 PROCEDURE — 93010 ELECTROCARDIOGRAM REPORT: CPT

## 2022-04-20 PROCEDURE — 99285 EMERGENCY DEPT VISIT HI MDM: CPT

## 2022-04-20 PROCEDURE — 71045 X-RAY EXAM CHEST 1 VIEW: CPT | Mod: 26

## 2022-04-20 PROCEDURE — 71275 CT ANGIOGRAPHY CHEST: CPT | Mod: 26,MA

## 2022-04-20 RX ORDER — ENOXAPARIN SODIUM 100 MG/ML
100 INJECTION SUBCUTANEOUS ONCE
Refills: 0 | Status: COMPLETED | OUTPATIENT
Start: 2022-04-20 | End: 2022-04-20

## 2022-04-20 NOTE — H&P ADULT - HISTORY OF PRESENT ILLNESS
Pt is a 37 year old female with PMH PE (2/2 breast reduction surgery in 2019) presents to ED with complaints of SOB since last night. Pt states for past few days, she's has been having chest pain, sharp, non radiating with no alleviating or aggravating factors. Pt states also having sob, worse with exertion, however also at rest. Pt states pain is similar to last PE. Pt has been off Eliquis for 1 year. Pt denies any fever, chills, bodyaches, abdominal pain, NVCD. no recent surgery, travel and not on BC. Pt denies any FH of heart disease or sudden death.  Patient is fairly active, has 2 jobs, has no chronic medical diseases and takes no meds.    Saturating well on RA.    Admit for PE workup

## 2022-04-20 NOTE — ED PROVIDER NOTE - NS ED ATTENDING STATEMENT MOD
This was a shared visit with the SOFIA. I reviewed and verified the documentation and independently performed the documented:

## 2022-04-20 NOTE — H&P ADULT - NSHPREVIEWOFSYSTEMS_GEN_ALL_CORE
REVIEW OF SYSTEMS:    CONSTITUTIONAL: No weakness, fevers or chills  EYES/ENT: No visual changes;  No vertigo or throat pain   NECK: No pain or stiffness  RESPIRATORY: shortness of breath  CARDIOVASCULAR: chest pain w/o palpitations  GASTROINTESTINAL: No abdominal or epigastric pain. No nausea, vomiting, or hematemesis; No diarrhea or constipation. No melena or hematochezia.  GENITOURINARY: No dysuria, frequency or hematuria  NEUROLOGICAL: No numbness or weakness  SKIN: No itching, rashes

## 2022-04-20 NOTE — ED PROVIDER NOTE - CLINICAL SUMMARY MEDICAL DECISION MAKING FREE TEXT BOX
37F with PMH provoke PE after surgery 2019 previously on eliquis who presents to [facility] chest pressure. EKG, labs and imaging reviewed. Patient with RLL subsegmental PE, however CT shows evidence of RH strain. trop neg, bnp wnl. Patient started on AC and admitted for further tx.

## 2022-04-20 NOTE — ED PROVIDER NOTE - ATTENDING APP SHARED VISIT CONTRIBUTION OF CARE
37F with PMH provoke PE after surgery 2019 previously on eliquis who presents to [facility] chest pressure. Pain at times described as substernal tightness, non-radiating, aggravated by taking a deep breath and drinking fluids. Occasionally associated with SOB, not exertional. She reports Sx similar to her prior PE. Denies HRT, recent travel or surgery, tobacco use, h/o of asthma.     vs noted, triage note reviewed    Gen - NAD, Head - NCAT, Pharynx - clear, MMM, Heart - RRR, no m/g/r, Lungs - CTAB, no w/c/r, Abdomen - soft, NT, ND, Skin - No rash, Extremities - FROM, no edema, erythema, ecchymosis, brisk cap refill, Neuro - A&O x3, equal strength and sensation, non-focal exam    a/p:  sob, cp similar to prior PE  EKG, CXR labs  CTA chest   reassess

## 2022-04-20 NOTE — H&P ADULT - ATTENDING COMMENTS
Patient seen and examined on 4/21/22. Case discussed with resident team.  Please see progress note for the same day

## 2022-04-20 NOTE — H&P ADULT - ASSESSMENT
Pt is a 37 year old female with PMH PE (2/2 breast reduction surgery in 2019) presents to ED with complaints of SOB since last night.  PAtient admitted for PE    # Unprovoked submassive PE  # Hx of provoked PE in 2019 2/2 to breast reduction Sx  - No recent surgery, travel and not on BC.  - Stopped eliquis 1 year ago  - Denies any FH of heart disease or sudden death.  - Patient is fairly active, has 2 jobs, has no chronic medical diseases and takes no meds.  - No calf tenderness or swelling  - CTA: acute pulmonary emboli, are noted in a subsegmental branch of the right lower lobe pulmonary artery with evidence of right heart strain  - Trops -ve  - BNP 17  - patient weighs 220lb  - lovenox 100mg BID  - VA lower extremity duplex  - TTE  - age appropriate Cancer screening  - CEA, AFP, CEA,   - RA, DAYAMI, Protein C and S, lupus anticoagulant  - Anticardiolipin Ab, homocytocysteine, Prothrombin Gene mutation    Diet: Regular  Activity: IAT  DVT Prophylaxis: lovenox  GI Prophylaxis: not indicated  CHG Order  Code Status: Full   Disposition: admit   Pt is a 37 year old female with PMH PE (2/2 breast reduction surgery in 2019) presents to ED with complaints of SOB since last night.  PAtient admitted for PE    # Unprovoked submassive PE  # Hx of provoked PE in 2019 2/2 to breast reduction Sx  - No recent surgery, travel and not on BC.  - Stopped eliquis 1 year ago  - Denies any FH of heart disease or sudden death.  - Patient is fairly active, has 2 jobs, has no chronic medical diseases and takes no meds.  - No calf tenderness or swelling  - CTA: acute pulmonary emboli, are noted in a subsegmental branch of the right lower lobe pulmonary artery with evidence of right heart strain  - Trops -ve  - BNP 17  - patient weighs 220lb  - lovenox 100mg BID  - VA lower extremity duplex  - TTE  - age appropriate Cancer screening  - AFP, CEA,   - RF, DAYAMI, Protein C and S, lupus anticoagulant  - Anticardiolipin Ab, homocytocysteine, Prothrombin Gene mutation    Diet: Regular  Activity: IAT  DVT Prophylaxis: lovenox  GI Prophylaxis: not indicated  CHG Order  Code Status: Full   Disposition: admit

## 2022-04-20 NOTE — H&P ADULT - NSHPPHYSICALEXAM_GEN_ALL_CORE
VITALS:   T(C): 36.9 (04-20-22 @ 18:29), Max: 36.9 (04-20-22 @ 18:29)  HR: 98 (04-20-22 @ 18:29) (98 - 98)  BP: 143/63 (04-20-22 @ 18:29) (143/63 - 143/63)  RR: 18 (04-20-22 @ 18:29) (18 - 18)  SpO2: 98% (04-20-22 @ 18:29) (98% - 98%)    GENERAL: NAD, lying in bed comfortably  HEAD:  Atraumatic, normocephalic  EYES: EOMI, PERRLA, conjunctiva and sclera clear  ENT: Moist mucous membranes  NECK: Supple, no JVD  HEART: Regular rate and rhythm, no murmurs, rubs, or gallops  LUNGS: Unlabored respirations.  Clear to auscultation bilaterally, no crackles, wheezing, or rhonchi  ABDOMEN: Soft, nontender, nondistended, +BS  EXTREMITIES: 2+ peripheral pulses bilaterally. No clubbing, cyanosis, or edema  NERVOUS SYSTEM:  A&Ox3, no focal deficits   SKIN: No rashes or lesions

## 2022-04-20 NOTE — H&P ADULT - NSHPLABSRESULTS_GEN_ALL_CORE
LABS:  cret                        9.5    4.11  )-----------( 317      ( 20 Apr 2022 20:35 )             29.6     04-20    138  |  102  |  12  ----------------------------<  86  4.5   |  24  |  0.7    Ca    9.3      20 Apr 2022 20:35    TPro  6.5  /  Alb  3.9  /  TBili  <0.2  /  DBili  x   /  AST  17  /  ALT  24  /  AlkPhos  52  04-20

## 2022-04-21 LAB
AFP-TM SERPL-MCNC: 3.6 NG/ML — SIGNIFICANT CHANGE UP
ALBUMIN SERPL ELPH-MCNC: 3.7 G/DL — SIGNIFICANT CHANGE UP (ref 3.5–5.2)
ALP SERPL-CCNC: 47 U/L — SIGNIFICANT CHANGE UP (ref 30–115)
ALT FLD-CCNC: 21 U/L — SIGNIFICANT CHANGE UP (ref 0–41)
ANION GAP SERPL CALC-SCNC: 11 MMOL/L — SIGNIFICANT CHANGE UP (ref 7–14)
AST SERPL-CCNC: 16 U/L — SIGNIFICANT CHANGE UP (ref 0–41)
BASOPHILS # BLD AUTO: 0.02 K/UL — SIGNIFICANT CHANGE UP (ref 0–0.2)
BASOPHILS NFR BLD AUTO: 0.5 % — SIGNIFICANT CHANGE UP (ref 0–1)
BILIRUB DIRECT SERPL-MCNC: <0.2 MG/DL — SIGNIFICANT CHANGE UP (ref 0–0.3)
BILIRUB INDIRECT FLD-MCNC: >0 MG/DL — LOW (ref 0.2–1.2)
BILIRUB SERPL-MCNC: 0.2 MG/DL — SIGNIFICANT CHANGE UP (ref 0.2–1.2)
BUN SERPL-MCNC: 12 MG/DL — SIGNIFICANT CHANGE UP (ref 10–20)
CALCIUM SERPL-MCNC: 8.9 MG/DL — SIGNIFICANT CHANGE UP (ref 8.5–10.1)
CANCER AG19-9 SERPL-ACNC: <2 U/ML — SIGNIFICANT CHANGE UP
CEA SERPL-MCNC: <0.6 NG/ML — SIGNIFICANT CHANGE UP (ref 0–3.8)
CHLORIDE SERPL-SCNC: 102 MMOL/L — SIGNIFICANT CHANGE UP (ref 98–110)
CO2 SERPL-SCNC: 23 MMOL/L — SIGNIFICANT CHANGE UP (ref 17–32)
CREAT SERPL-MCNC: 0.7 MG/DL — SIGNIFICANT CHANGE UP (ref 0.7–1.5)
EGFR: 113 ML/MIN/1.73M2 — SIGNIFICANT CHANGE UP
EOSINOPHIL # BLD AUTO: 0.14 K/UL — SIGNIFICANT CHANGE UP (ref 0–0.7)
EOSINOPHIL NFR BLD AUTO: 3.6 % — SIGNIFICANT CHANGE UP (ref 0–8)
FIBRINOGEN PPP-MCNC: 458 MG/DL — SIGNIFICANT CHANGE UP (ref 204.4–570.6)
GLUCOSE SERPL-MCNC: 91 MG/DL — SIGNIFICANT CHANGE UP (ref 70–99)
HCT VFR BLD CALC: 28.1 % — LOW (ref 37–47)
HCYS SERPL-MCNC: 4.6 UMOL/L — SIGNIFICANT CHANGE UP
HGB BLD-MCNC: 9.1 G/DL — LOW (ref 12–16)
IMM GRANULOCYTES NFR BLD AUTO: 0.5 % — HIGH (ref 0.1–0.3)
LYMPHOCYTES # BLD AUTO: 1.62 K/UL — SIGNIFICANT CHANGE UP (ref 1.2–3.4)
LYMPHOCYTES # BLD AUTO: 41.8 % — SIGNIFICANT CHANGE UP (ref 20.5–51.1)
MAGNESIUM SERPL-MCNC: 1.9 MG/DL — SIGNIFICANT CHANGE UP (ref 1.8–2.4)
MCHC RBC-ENTMCNC: 31.6 PG — HIGH (ref 27–31)
MCHC RBC-ENTMCNC: 32.4 G/DL — SIGNIFICANT CHANGE UP (ref 32–37)
MCV RBC AUTO: 97.6 FL — SIGNIFICANT CHANGE UP (ref 81–99)
MONOCYTES # BLD AUTO: 0.5 K/UL — SIGNIFICANT CHANGE UP (ref 0.1–0.6)
MONOCYTES NFR BLD AUTO: 12.9 % — HIGH (ref 1.7–9.3)
NEUTROPHILS # BLD AUTO: 1.58 K/UL — SIGNIFICANT CHANGE UP (ref 1.4–6.5)
NEUTROPHILS NFR BLD AUTO: 40.7 % — LOW (ref 42.2–75.2)
NRBC # BLD: 0 /100 WBCS — SIGNIFICANT CHANGE UP (ref 0–0)
PLATELET # BLD AUTO: 299 K/UL — SIGNIFICANT CHANGE UP (ref 130–400)
POTASSIUM SERPL-MCNC: 4.1 MMOL/L — SIGNIFICANT CHANGE UP (ref 3.5–5)
POTASSIUM SERPL-SCNC: 4.1 MMOL/L — SIGNIFICANT CHANGE UP (ref 3.5–5)
PROT SERPL-MCNC: 6.2 G/DL — SIGNIFICANT CHANGE UP (ref 6–8)
RBC # BLD: 2.88 M/UL — LOW (ref 4.2–5.4)
RBC # FLD: 14.4 % — SIGNIFICANT CHANGE UP (ref 11.5–14.5)
RHEUMATOID FACT SERPL-ACNC: <10 IU/ML — SIGNIFICANT CHANGE UP (ref 0–13)
SARS-COV-2 RNA SPEC QL NAA+PROBE: SIGNIFICANT CHANGE UP
SODIUM SERPL-SCNC: 136 MMOL/L — SIGNIFICANT CHANGE UP (ref 135–146)
WBC # BLD: 3.88 K/UL — LOW (ref 4.8–10.8)
WBC # FLD AUTO: 3.88 K/UL — LOW (ref 4.8–10.8)

## 2022-04-21 PROCEDURE — 93306 TTE W/DOPPLER COMPLETE: CPT | Mod: 26

## 2022-04-21 PROCEDURE — 93970 EXTREMITY STUDY: CPT | Mod: 26

## 2022-04-21 PROCEDURE — 99251: CPT

## 2022-04-21 PROCEDURE — 99222 1ST HOSP IP/OBS MODERATE 55: CPT

## 2022-04-21 RX ORDER — APIXABAN 2.5 MG/1
10 TABLET, FILM COATED ORAL EVERY 12 HOURS
Refills: 0 | Status: DISCONTINUED | OUTPATIENT
Start: 2022-04-21 | End: 2022-04-22

## 2022-04-21 RX ORDER — ENOXAPARIN SODIUM 100 MG/ML
100 INJECTION SUBCUTANEOUS EVERY 12 HOURS
Refills: 0 | Status: DISCONTINUED | OUTPATIENT
Start: 2022-04-21 | End: 2022-04-21

## 2022-04-21 RX ADMIN — APIXABAN 10 MILLIGRAM(S): 2.5 TABLET, FILM COATED ORAL at 21:05

## 2022-04-21 RX ADMIN — ENOXAPARIN SODIUM 100 MILLIGRAM(S): 100 INJECTION SUBCUTANEOUS at 10:40

## 2022-04-21 RX ADMIN — ENOXAPARIN SODIUM 100 MILLIGRAM(S): 100 INJECTION SUBCUTANEOUS at 01:25

## 2022-04-21 NOTE — CONSULT NOTE ADULT - ATTENDING COMMENTS
patient seen and examined agree above note   on RA   BNP negative CE negative   lovenox Q 12 hrs can switch to eliquis Q 12 hrs most likely need life long   need echo   hematology eval as outpatient   follow h/h

## 2022-04-21 NOTE — CONSULT NOTE ADULT - ASSESSMENT
ASSESSMENT AND PLAN:    Pt is a 37 year old female with PMH PE (2/2 breast reduction surgery in 2019) presents to ED with complaints of SOB found to have subsegmental PEs.    #Subsegmental RIGHT PE with RHS  -CTA chest reviewed revealing minimal clot burden and small distal PE  -Recommend echocardiogram and venous duplex if not performed already  -No IR intervention for thrombectomy at this time, will f/u U/S results      Please call Interventional Radiology with questions or concerns:   - M-F [id number]: [phone]   - All other hours: [phone]  
Impression:  Likely low risk RLL PE  HO PE post surgery    Plan:  2decho  labs and LE doppler reviewed  Can change to DOAC  OP follow up  Hypercoaguable workup as outpatient  Age appropriate Ca screening

## 2022-04-21 NOTE — PROGRESS NOTE ADULT - ATTENDING COMMENTS
# acute PE- unprovoked with concern for right heart strain  2nd episode  trop- neg; BNP- neg  transition from lovenox to eliquis  echo  US lower extremity - duplex- pending results  need OP follow up with hematology team    Discharge plan: possibly tomorrow  Pending: ambulatory oxygen level to be checked tomorrow, echo; eliquis pricing

## 2022-04-21 NOTE — CONSULT NOTE ADULT - SUBJECTIVE AND OBJECTIVE BOX
Patient is a 37y old  Female who presents with a chief complaint of SOB (2022 09:28)      HPI:  Pt is a 37 year old female with PMH PE (2/2 breast reduction surgery in 2019) presents to ED with complaints of SOB since last night. Pt states for past few days, she's has been having chest pain, sharp, non radiating with no alleviating or aggravating factors. Pt states also having sob, worse with exertion, however also at rest. Pt states pain is similar to last PE. Pt has been off Eliquis for 1 year. Pt denies any fever, chills, bodyaches, abdominal pain, NVCD. no recent surgery, travel and not on BC. Pt denies any FH of heart disease or sudden death.  Patient is fairly active, has 2 jobs, has no chronic medical diseases and takes no meds.    Saturating well on RA.    Admit for PE workup (2022 23:30)      PAST MEDICAL & SURGICAL HISTORY:  Pulmonary embolism    H/O:         SOCIAL HX:   Smoking       denies                  ETOH                            Other    FAMILY HISTORY:  .  No cardiovascular or pulmonary family history     REVIEW OF SYSTEMS:    All ROS are negative exept per HPI       Allergies    No Known Allergies    Intolerances          PHYSICAL EXAM  Vital Signs Last 24 Hrs  T(C): 36.9 (2022 07:33), Max: 37.1 (2022 00:50)  T(F): 98.4 (2022 07:33), Max: 98.7 (2022 00:50)  HR: 70 (2022 07:33) (70 - 98)  BP: 105/51 (2022 07:33) (104/52 - 143/63)  BP(mean): --  RR: 18 (2022 07:33) (18 - 19)  SpO2: 99% (2022 07:33) (98% - 100%)    CONSTITUTIONAL:  Well nourished.  NAD    ENT:   Airway patent,   No thrush  on room air    EYES:   Clear bilaterally,   pupils equal,   round and reactive to light.    CARDIAC:   Normal rate,   regular rhythm.    no edema      RESPIRATORY:   No wheezing   Normal chest expansion  Not tachypneic,  No use of accessory muscles    GASTROINTESTINAL:  Abdomen soft, non-tender,   No guarding,   Positive BS    MUSCULOSKELETAL:   Range of motion is not limited,  No clubbing, cyanosis    NEUROLOGICAL:   Alert and oriented   No motor deficits.    SKIN:   Skin normal color for race,   No evidence of rash.        LABS:                          9.1    3.88  )-----------( 299      ( 2022 06:00 )             28.1                                               04-    136  |  102  |  12  ----------------------------<  91  4.1   |  23  |  0.7    Ca    8.9      2022 06:00  Mg     1.9     -    TPro  6.2  /  Alb  3.7  /  TBili  0.2  /  DBili  <0.2  /  AST  16  /  ALT  21  /  AlkPhos  47  04-21                                                 CARDIAC MARKERS ( 2022 22:16 )  x     / <0.01 ng/mL / x     / x     / x      CARDIAC MARKERS ( 2022 20:35 )  x     / <0.01 ng/mL / x     / x     / x                                                LIVER FUNCTIONS - ( 2022 06:00 )  Alb: 3.7 g/dL / Pro: 6.2 g/dL / ALK PHOS: 47 U/L / ALT: 21 U/L / AST: 16 U/L / GGT: x                                                                                                MEDICATIONS  (STANDING):  enoxaparin Injectable 100 milliGRAM(s) SubCutaneous every 12 hours    MEDICATIONS  (PRN):      X-Rays reviewed:    CXR interpreted by me:
INTERVENTIONAL RADIOLOGY CONSULT:     HPI:  Pt is a 37 year old female with PMH PE (2/2 breast reduction surgery in 2019) presents to ED with complaints of SOB since last night. Pt states for past few days, she's has been having chest pain, sharp, non radiating with no alleviating or aggravating factors. Pt states also having sob, worse with exertion, however also at rest. Pt states pain is similar to last PE. Pt has been off Eliquis for 1 year. Pt denies any fever, chills, bodyaches, abdominal pain, NVCD. no recent surgery, travel and not on BC. Pt denies any FH of heart disease or sudden death.  Patient is fairly active, has 2 jobs, has no chronic medical diseases and takes no meds.    Saturating well on RA.    Admit for PE workup (2022 23:30)    PAST MEDICAL & SURGICAL HISTORY:  Pulmonary embolism  H/O:     MEDICATIONS  (STANDING):  enoxaparin Injectable 100 milliGRAM(s) SubCutaneous every 12 hours    MEDICATIONS  (PRN):    Allergies    No Known Allergies    Intolerances    FAMILY HISTORY:    Vital Signs Last 24 Hrs  T(C): 36.9 (2022 07:33), Max: 37.1 (2022 00:50)  T(F): 98.4 (2022 07:33), Max: 98.7 (2022 00:50)  HR: 70 (2022 07:33) (70 - 98)  BP: 105/51 (2022 07:33) (104/52 - 143/63)  BP(mean): --  RR: 18 (2022 07:33) (18 - 19)  SpO2: 99% (2022 07:33) (98% - 100%)    Labs:                         9.1    3.88  )-----------( 299      ( 2022 06:00 )             28.1     04-21    136  |  102  |  12  ----------------------------<  91  4.1   |  23  |  0.7    Ca    8.9      2022 06:00  Mg     1.9     04-    TPro  6.2  /  Alb  3.7  /  TBili  0.2  /  DBili  <0.2  /  AST  16  /  ALT  21  /  AlkPhos  47      Pertinent labs:                      9.1    3.88  )-----------( 299      ( 2022 06:00 )             28.1         136  |  102  |  12  ----------------------------<  91  4.1   |  23  |  0.7    Ca    8.9      2022 06:00  Mg     1.9         TPro  6.2  /  Alb  3.7  /  TBili  0.2  /  DBili  <0.2  /  AST  16  /  ALT  21  /  AlkPhos  47      Radiology & Additional Studies:     Radiology imaging reviewed.

## 2022-04-21 NOTE — PATIENT PROFILE ADULT - FALL HARM RISK - UNIVERSAL INTERVENTIONS
Bed in lowest position, wheels locked, appropriate side rails in place/Call bell, personal items and telephone in reach/Instruct patient to call for assistance before getting out of bed or chair/Non-slip footwear when patient is out of bed/[place] to call system/Physically safe environment - no spills, clutter or unnecessary equipment/Purposeful Proactive Rounding/Room/bathroom lighting operational, light cord in reach

## 2022-04-22 ENCOUNTER — TRANSCRIPTION ENCOUNTER (OUTPATIENT)
Age: 37
End: 2022-04-22

## 2022-04-22 VITALS
RESPIRATION RATE: 18 BRPM | TEMPERATURE: 98 F | OXYGEN SATURATION: 97 % | DIASTOLIC BLOOD PRESSURE: 59 MMHG | HEART RATE: 79 BPM | SYSTOLIC BLOOD PRESSURE: 113 MMHG

## 2022-04-22 LAB
ALBUMIN SERPL ELPH-MCNC: 3.8 G/DL — SIGNIFICANT CHANGE UP (ref 3.5–5.2)
ALP SERPL-CCNC: 42 U/L — SIGNIFICANT CHANGE UP (ref 30–115)
ALT FLD-CCNC: 20 U/L — SIGNIFICANT CHANGE UP (ref 0–41)
ANION GAP SERPL CALC-SCNC: 11 MMOL/L — SIGNIFICANT CHANGE UP (ref 7–14)
APTT BLD: 32 SEC — SIGNIFICANT CHANGE UP (ref 27–39.2)
AST SERPL-CCNC: 17 U/L — SIGNIFICANT CHANGE UP (ref 0–41)
BILIRUB SERPL-MCNC: <0.2 MG/DL — SIGNIFICANT CHANGE UP (ref 0.2–1.2)
BUN SERPL-MCNC: 7 MG/DL — LOW (ref 10–20)
CALCIUM SERPL-MCNC: 8.9 MG/DL — SIGNIFICANT CHANGE UP (ref 8.5–10.1)
CHLORIDE SERPL-SCNC: 104 MMOL/L — SIGNIFICANT CHANGE UP (ref 98–110)
CO2 SERPL-SCNC: 23 MMOL/L — SIGNIFICANT CHANGE UP (ref 17–32)
CREAT SERPL-MCNC: 0.7 MG/DL — SIGNIFICANT CHANGE UP (ref 0.7–1.5)
EGFR: 113 ML/MIN/1.73M2 — SIGNIFICANT CHANGE UP
FERRITIN SERPL-MCNC: 228 NG/ML — HIGH (ref 15–150)
FOLATE SERPL-MCNC: 16.4 NG/ML — SIGNIFICANT CHANGE UP
GLUCOSE SERPL-MCNC: 89 MG/DL — SIGNIFICANT CHANGE UP (ref 70–99)
HCT VFR BLD CALC: 30.4 % — LOW (ref 37–47)
HGB BLD-MCNC: 10 G/DL — LOW (ref 12–16)
INR BLD: 1.19 RATIO — SIGNIFICANT CHANGE UP (ref 0.65–1.3)
MAGNESIUM SERPL-MCNC: 1.9 MG/DL — SIGNIFICANT CHANGE UP (ref 1.8–2.4)
MCHC RBC-ENTMCNC: 32.4 PG — HIGH (ref 27–31)
MCHC RBC-ENTMCNC: 32.9 G/DL — SIGNIFICANT CHANGE UP (ref 32–37)
MCV RBC AUTO: 98.4 FL — SIGNIFICANT CHANGE UP (ref 81–99)
NRBC # BLD: 0 /100 WBCS — SIGNIFICANT CHANGE UP (ref 0–0)
PLATELET # BLD AUTO: 317 K/UL — SIGNIFICANT CHANGE UP (ref 130–400)
POTASSIUM SERPL-MCNC: 4.2 MMOL/L — SIGNIFICANT CHANGE UP (ref 3.5–5)
POTASSIUM SERPL-SCNC: 4.2 MMOL/L — SIGNIFICANT CHANGE UP (ref 3.5–5)
PROT SERPL-MCNC: 6.3 G/DL — SIGNIFICANT CHANGE UP (ref 6–8)
PROTHROM AB SERPL-ACNC: 13.7 SEC — HIGH (ref 9.95–12.87)
RBC # BLD: 3.09 M/UL — LOW (ref 4.2–5.4)
RBC # FLD: 14.5 % — SIGNIFICANT CHANGE UP (ref 11.5–14.5)
SODIUM SERPL-SCNC: 138 MMOL/L — SIGNIFICANT CHANGE UP (ref 135–146)
VIT B12 SERPL-MCNC: 901 PG/ML — SIGNIFICANT CHANGE UP (ref 232–1245)
WBC # BLD: 3.31 K/UL — LOW (ref 4.8–10.8)
WBC # FLD AUTO: 3.31 K/UL — LOW (ref 4.8–10.8)

## 2022-04-22 PROCEDURE — 99239 HOSP IP/OBS DSCHRG MGMT >30: CPT

## 2022-04-22 RX ORDER — APIXABAN 2.5 MG/1
1 TABLET, FILM COATED ORAL
Qty: 88 | Refills: 0
Start: 2022-04-22 | End: 2022-05-21

## 2022-04-22 RX ADMIN — APIXABAN 10 MILLIGRAM(S): 2.5 TABLET, FILM COATED ORAL at 12:10

## 2022-04-22 NOTE — DISCHARGE NOTE NURSING/CASE MANAGEMENT/SOCIAL WORK - NSDCPEFALRISK_GEN_ALL_CORE
For information on Fall & Injury Prevention, visit: [email or web address] OR  [email or web address] OR  https://www.cdc.gov/steadi/patient.html

## 2022-04-22 NOTE — PROGRESS NOTE ADULT - ASSESSMENT
37 year old woman with PMH of PE after breast reduction surgery in 2019 (treated with apixaban which was stopped 1 year ago after CT scan showed resolution of PE per pt) now presented to the ED with complaints of SOB since last night.   CTA + for subsegmental PE    1. Unprovoked PE - second PE episode in this pt   h/o PE after surgery in 2019 and treated with apixaban  evaluated by pulm and IR  continue anticoagulation at this time - apixaban 10mg q12hr x 7 days and then 5mg q12hr  needs lifelong anticoagulation at this time  venous duplex negative for DVT  troponin negative  BNP WNL  ECHO: no RV strain  age appropriate malignancy screening  hypercoagulable w/u as outpt  stable for discharge home today - ambulating and tolerating RA  outpt f/u with PMD, pulm and hemonc    med reconciliation and discharge papers reviewed    spent 35 minutes on the discharge process of this pt

## 2022-04-22 NOTE — DISCHARGE NOTE PROVIDER - NSDCMRMEDTOKEN_GEN_ALL_CORE_FT
Eliquis Starter Pack for Treatment of DVT and PE 5 mg oral tablet: Please take 2 tabs (10 mg) twice a day for 7 days then take 5 mg (1 tab) twice a day then after

## 2022-04-22 NOTE — PROGRESS NOTE ADULT - SUBJECTIVE AND OBJECTIVE BOX
Discharge note      LAUREN FLORES  37y Female    INTERVAL HPI/OVERNIGHT EVENTS:    pt feels better today and wants to go home  denies SOB at rest and on exertion, chest pain, cough, N/V  ROS negative  denies personal or FH of malignancy  discussed with the pt the importance of hypercoagulable workup and age appropriate malignancy screening: at this age, needs pap smears for cervical cancer screening    T(F): 97.8 (04-22-22 @ 04:38), Max: 98.3 (04-21-22 @ 23:48)  HR: 79 (04-22-22 @ 04:38) (68 - 84)  BP: 113/59 (04-22-22 @ 04:38) (96/58 - 132/62)  RR: 18 (04-22-22 @ 04:38) (18 - 18)  SpO2: 97% (04-22-22 @ 04:38) (97% - 97%) on RA      PHYSICAL EXAM:  GENERAL: NAD  HEAD:  Normocephalic  EYES:  conjunctiva and sclera clear  ENMT: Moist mucous membranes  NECK: Supple, No JVD  NERVOUS SYSTEM:  Alert, awake, Good concentration  CHEST/LUNG: CTA b/l; No rales, rhonchi, wheezing  HEART: Regular rate and rhythm; No murmurs  ABDOMEN: Soft, Nontender, Nondistended; Bowel sounds present  EXTREMITIES: No edema  SKIN: warm, dry    Consultant(s) Notes Reviewed:  [x ] YES  [ ] NO  Care Discussed with Consultants/Other Providers [ x] YES  [ ] NO    MEDICATIONS  (STANDING):  apixaban 10 milliGRAM(s) Oral every 12 hours    MEDICATIONS  (PRN):      Telemetry reviewed by me - no events    LABS:                        10.0   3.31  )-----------( 317      ( 22 Apr 2022 07:35 )             30.4     04-22    138  |  104  |  7<L>  ----------------------------<  89  4.2   |  23  |  0.7    Ca    8.9      22 Apr 2022 07:35  Mg     1.9     04-22    TPro  6.3  /  Alb  3.8  /  TBili  <0.2  /  DBili  x   /  AST  17  /  ALT  20  /  AlkPhos  42  04-22    PT/INR - ( 22 Apr 2022 07:35 )   PT: 13.70 sec;   INR: 1.19 ratio         PTT - ( 22 Apr 2022 07:35 )  PTT:32.0 sec  CARDIAC MARKERS ( 20 Apr 2022 22:16 )  x     / <0.01 ng/mL / x     / x     / x      CARDIAC MARKERS ( 20 Apr 2022 20:35 )  x     / <0.01 ng/mL / x     / x     / x              RADIOLOGY & ADDITIONAL TESTS:    Imaging or report Personally Reviewed:  [x ] YES  [ ] NO    < from: VA Duplex Lower Ext Vein Scan, Bilat (04.21.22 @ 08:01) >  Impression:    No evidence of deep venous thrombosis or superficial thrombophlebitis in   the bilateral lower extremities.    < end of copied text >  < from: CT Angio Chest PE Protocol w/ IV Cont (04.20.22 @ 21:33) >  evidence of pulmonary embolism.Intraluminal filling defects,   consistent with acute pulmonary emboli, are noted in a subsegmental   branch of the right lower lobe pulmonary artery (3/57). No evidence of   central pulmonary embolism.There is evidence of right heart strain (RV:LV   ratio >1; RV=5.2 cm and LV=4.5 cm)    < end of copied text >  < from: TTE Echo Complete w/ Contrast w/ Doppler (04.21.22 @ 23:00) >    Summary:   1. Normal global left ventricular systolic function.   2. Normal left atrial size.   3. Normal right atrial size.   4. Mild mitral valve regurgitation.   5. Mild tricuspid regurgitation.    < end of copied text >      Case discussed with residents and RN on rounds today    Care discussed with pt        
LAUREN FLORES 37y Female  MRN#: [id number]         SUBJECTIVE  Patient is a 37y old Female who presents with a chief complaint of SOB (2022 23:30)  Currently admitted to medicine with the primary diagnosis of Pulmonary embolism    PAtient feels better. she has no complaints now      OBJECTIVE  PAST MEDICAL & SURGICAL HISTORY  Pulmonary embolism    H/O:       ALLERGIES:  No Known Allergies    MEDICATIONS:  STANDING MEDICATIONS  enoxaparin Injectable 100 milliGRAM(s) SubCutaneous every 12 hours    PRN MEDICATIONS      VITAL SIGNS: Last 24 Hours  T(C): 36.9 (2022 07:33), Max: 37.1 (2022 00:50)  T(F): 98.4 (2022 07:33), Max: 98.7 (2022 00:50)  HR: 70 (2022 07:33) (70 - 98)  BP: 105/51 (2022 07:33) (104/52 - 143/63)  BP(mean): --  RR: 18 (2022 07:33) (18 - 19)  SpO2: 99% (2022 07:33) (98% - 100%)    LABS:                        9.1    3.88  )-----------( 299      ( 2022 06:00 )             28.1     -    136  |  102  |  12  ----------------------------<  91  4.1   |  23  |  0.7    Ca    8.9      2022 06:00  Mg     1.9         TPro  6.2  /  Alb  3.7  /  TBili  0.2  /  DBili  <0.2  /  AST  16  /  ALT  21  /  AlkPhos  47  -          Troponin T, Serum: <0.01 ng/mL (22 @ 22:16)  Troponin T, Serum: <0.01 ng/mL (22 @ 20:35)      CARDIAC MARKERS ( 2022 22:16 )  x     / <0.01 ng/mL / x     / x     / x      CARDIAC MARKERS ( 2022 20:35 )  x     / <0.01 ng/mL / x     / x     / x          RADIOLOGY:      PHYSICAL EXAM:    GENERAL: NAD, well-developed, AAOx3  HEENT:  Atraumatic, Normocephalic. EOMI, PERRLA, conjunctiva and sclera clear, No JVD  PULMONARY: Clear to auscultation bilaterally; No wheeze  CARDIOVASCULAR: Regular rate and rhythm; No murmurs, rubs, or gallops  GASTROINTESTINAL: Soft, Nontender, Nondistended; Bowel sounds present  MUSCULOSKELETAL:  2+ Peripheral Pulses, No clubbing, cyanosis, or edema  NEUROLOGY: non-focal  SKIN: No rashes or lesions      Assessment and Plan  Pt is a 37 year old female with PMH PE (2/2 breast reduction surgery in 2019) presents to ED with complaints of SOB since last night.  PAtient admitted for PE    # Unprovoked submassive PE  # Hx of provoked PE in 2019  to breast reduction Sx  - No recent surgery, travel and not on BC.  - Stopped eliquis 1 year ago  - Denies any FH of heart disease or sudden death.  - Patient is fairly active, has 2 jobs, has no chronic medical diseases and takes no meds.  - No calf tenderness or swelling  - CTA: acute pulmonary emboli, are noted in a subsegmental branch of the right lower lobe pulmonary artery with evidence of right heart strain  - Trops -ve  - BNP 17  - patient weighs 220lb  - lovenox 100mg BID  - VA lower extremity duplex --> prelim negative  - TTE  - get pulm/IR/vascular consults  - age appropriate Cancer screening  - AFP, CEA,   - RF, DAYAMI, Protein C and S, lupus anticoagulant  - Anticardiolipin Ab, homocytocysteine, Prothrombin Gene mutation    Diet: Regular  Activity: IAT  DVT Prophylaxis: lovenox  GI Prophylaxis: not indicated  CHG Order  Code Status: Full   Disposition: admit

## 2022-04-22 NOTE — DISCHARGE NOTE NURSING/CASE MANAGEMENT/SOCIAL WORK - PATIENT PORTAL LINK FT
You can access the FollowMyHealth Patient Portal offered by [facility] by registering at the following website: [email or web address]. By joining [employer]’s FollowMyHealth portal, you will also be able to view your health information using other applications (apps) compatible with our system.

## 2022-04-22 NOTE — DISCHARGE NOTE PROVIDER - NSDCCPCAREPLAN_GEN_ALL_CORE_FT
PRINCIPAL DISCHARGE DIAGNOSIS  Diagnosis: Pulmonary embolism  Assessment and Plan of Treatment:   What are the causes?  This condition is usually caused by a blood clot that forms in a vein and moves to the lungs. In rare cases, it may be caused by air, fat, part of a tumor, or other tissue that moves through the veins and into the lungs.         PRINCIPAL DISCHARGE DIAGNOSIS  Diagnosis: Pulmonary embolism  Assessment and Plan of Treatment: You were diagnosed with a blood clot in the artery of the right lung and you will need to take apixaban 10mg twice a day for 7 days and then 5mg twice a day. Your doctor will determine the duration but you likely need long term treatment to prevent another recurrence. Follow up with your primary doctor, lung doctor and hematologist for further workup and monitoring.   What are the causes?  This condition is usually caused by a blood clot that forms in a vein and moves to the lungs. a tumor, or other tissue that moves through the veins and into the lungs.

## 2022-04-22 NOTE — DISCHARGE NOTE PROVIDER - CARE PROVIDER_API CALL
Edgardo Dyer)  Internal Medicine; Medical Oncology  [address] [place] [address]  Phone: [phone]  Fax: [phone]  Follow Up Time: 2 weeks   Edgardo Dyer)  Internal Medicine; Medical Oncology  [address] [place] [address]  Phone: [phone]  Fax: [phone]  Follow Up Time: 2 weeks    Dakota Bain)  [id number]  [address] [place] [address]  Phone: [phone]  Fax: [phone]  Follow Up Time: 2 weeks

## 2022-04-22 NOTE — DISCHARGE NOTE PROVIDER - CARE PROVIDERS DIRECT ADDRESSES
,[email or web address]

## 2022-04-22 NOTE — DISCHARGE NOTE PROVIDER - HOSPITAL COURSE
hPt is a 37 year old female with PMH PE (2/2 breast reduction surgery in 2019) presents to ED with complaints of SOB since last night. Pt states for past few days, she's has been having chest pain, sharp, non radiating with no alleviating or aggravating factors. Pt states also having sob, worse with exertion, however also at rest. Pt states pain is similar to last PE. Pt has been off Eliquis for 1 year. Pt denies any fever, chills, bodyaches, abdominal pain, NVCD. no recent surgery, travel and not on BC. Pt denies any FH of heart disease or sudden death.  Patient is fairly active, has 2 jobs, has no chronic medical diseases and takes no meds.    Saturating well on RA.    Admit for PE workup    # Unprovoked submassive PE  # Hx of provoked PE in 2019 2/2 to breast reduction Sx  - No recent surgery, travel and not on BC.  - Stopped eliquis 1 year ago  - Denies any FH of heart disease or sudden death.  - Patient is fairly active, has 2 jobs, has no chronic medical diseases and takes no meds.  - No calf tenderness or swelling  - CTA: acute pulmonary emboli, are noted in a subsegmental branch of the right lower lobe pulmonary artery with evidence of right heart strain  - Trops -ve  - BNP 17  - patient weighs 220lb  - lovenox 100mg BID  - VA lower extremity duplex  - TTE  - age appropriate Cancer screening  - AFP, CEA,   - RF, DAYAMI, Protein C and S, lupus anticoagulant  - Anticardiolipin Ab, homocytocysteine, Prothrombin Gene mutation

## 2022-04-22 NOTE — DISCHARGE NOTE PROVIDER - PROVIDER TOKENS
PROVIDER:[TOKEN:[[id number]],FOLLOWUP:[2 weeks]] PROVIDER:[TOKEN:[[id number]],FOLLOWUP:[2 weeks]],PROVIDER:[TOKEN:[[id number]],FOLLOWUP:[2 weeks]]

## 2022-04-25 LAB
ANA TITR SER: NEGATIVE — SIGNIFICANT CHANGE UP
APCR PPP: 2.81 RATIO — SIGNIFICANT CHANGE UP
CARDIOLIPIN AB SER-ACNC: NEGATIVE — SIGNIFICANT CHANGE UP

## 2022-04-26 LAB — PROT S FREE PPP-ACNC: 69 % — SIGNIFICANT CHANGE UP (ref 63–140)

## 2022-04-27 LAB
DRVVT SCREEN TO CONFIRM RATIO: SIGNIFICANT CHANGE UP
LA NT DPL PPP QL: SIGNIFICANT CHANGE UP
NORMALIZED SCT PPP-RTO: 0.86 RATIO — SIGNIFICANT CHANGE UP (ref 0–1.16)
NORMALIZED SCT PPP-RTO: SIGNIFICANT CHANGE UP
PROT C ACT/NOR PPP: 88 % — SIGNIFICANT CHANGE UP (ref 65–129)

## 2022-04-28 DIAGNOSIS — R06.02 SHORTNESS OF BREATH: ICD-10-CM

## 2022-04-28 DIAGNOSIS — I51.9 HEART DISEASE, UNSPECIFIED: ICD-10-CM

## 2022-04-28 DIAGNOSIS — Z86.711 PERSONAL HISTORY OF PULMONARY EMBOLISM: ICD-10-CM

## 2022-04-28 DIAGNOSIS — I26.99 OTHER PULMONARY EMBOLISM WITHOUT ACUTE COR PULMONALE: ICD-10-CM

## 2022-06-08 ENCOUNTER — LABORATORY RESULT (OUTPATIENT)
Age: 37
End: 2022-06-08

## 2022-06-08 ENCOUNTER — APPOINTMENT (OUTPATIENT)
Dept: HEMATOLOGY ONCOLOGY | Facility: CLINIC | Age: 37
End: 2022-06-08
Payer: SELF-PAY

## 2022-06-08 ENCOUNTER — OUTPATIENT (OUTPATIENT)
Dept: OUTPATIENT SERVICES | Facility: HOSPITAL | Age: 37
LOS: 1 days | Discharge: HOME | End: 2022-06-08

## 2022-06-08 VITALS
WEIGHT: 220 LBS | HEART RATE: 72 BPM | RESPIRATION RATE: 14 BRPM | SYSTOLIC BLOOD PRESSURE: 141 MMHG | DIASTOLIC BLOOD PRESSURE: 87 MMHG | HEIGHT: 61 IN | BODY MASS INDEX: 41.54 KG/M2

## 2022-06-08 DIAGNOSIS — Z98.891 HISTORY OF UTERINE SCAR FROM PREVIOUS SURGERY: Chronic | ICD-10-CM

## 2022-06-08 DIAGNOSIS — D64.9 ANEMIA, UNSPECIFIED: ICD-10-CM

## 2022-06-08 DIAGNOSIS — I26.99 OTHER PULMONARY EMBOLISM W/OUT ACUTE COR PULMONALE: ICD-10-CM

## 2022-06-08 LAB
HCT VFR BLD CALC: 35.6 %
HGB BLD-MCNC: 11.7 G/DL
MCHC RBC-ENTMCNC: 31 PG
MCHC RBC-ENTMCNC: 32.9 G/DL
MCV RBC AUTO: 94.2 FL
PLATELET # BLD AUTO: 276 K/UL
PMV BLD: 9.9 FL
RBC # BLD: 3.78 M/UL
RBC # FLD: 13.3 %
WBC # FLD AUTO: 5.84 K/UL

## 2022-06-08 PROCEDURE — 99214 OFFICE O/P EST MOD 30 MIN: CPT

## 2022-06-10 DIAGNOSIS — D64.9 ANEMIA, UNSPECIFIED: ICD-10-CM

## 2022-06-10 DIAGNOSIS — I26.99 OTHER PULMONARY EMBOLISM WITHOUT ACUTE COR PULMONALE: ICD-10-CM

## 2022-06-13 NOTE — END OF VISIT
[FreeTextEntry3] : I was physically present for the key portions of the evaluation and management service provided.  I agree with the history and physical, and plan which I have reviewed and edited where appropriate.\par \par Patient returns for follow-up visit.  She presents for follow-up after in April she came into the hospital with shortness of breath and a CTA chest was done concerning for subsegmental branch pulmonary emboli.  Lower extremity Dopplers were negative a D-dimer was not done.  She states she has been short of breath on and off ever since having COVID.  Also during that admission she had an echo which did not show pulmonary hypertension.  She went home on Eliquis.\par \par I explained that I am not sure if the subsegmental event was indeed a new event.  During the visit.\par \par Today on 6/9/2022 I reviewed the CTA with 2 of our thoracic radiologist and both agreed that the findings are inconclusive for a new subsegmental pulmonary embolism.\par -I called the patient back to discuss these findings because at this point in time her preference is important for example we could just complete 3 months of anticoagulation and stop it versus continuing lifelong if this is indeed a unprovoked event once again.\par -she did not  and left a message to call back\par \par In regards to her anemia the CBC only showed mild anemia we checked iron studies and hemoglobin electrophoresis and will discuss results with patient once I reach her but it showed may be slight iron deficiency given we will set and we just recommend oral iron tablet 3 times a week because she continues to have heavy bleeding from her menstrual periods.\par \par Addendum 6/13/22 Spoke with her and we decided to complete only 3 monts of AC than stop sice her CTs were indeterminate. She agrees

## 2022-06-13 NOTE — HISTORY OF PRESENT ILLNESS
[de-identified] : 35 year old female here today for evaluation and management of new PE.  She was not seen in [facility] this is a new consult  at the request of the medicine team. \par \par Trudi is a 34 yo female of  decent who has no significant PMH and PSHx including recent breast reduction, gastric sleeve, and . \par She had a surgery in [place] on 6/3/2020 when she underwent a breast reduction and lift with abdominoplasty. She was admitted to the ED on 2020 with complaints of surgical site infection. CT C/A/P showed no discrete abdominal wall collection with an incidental  PE finding. CTA chest was done showing right lower lobar pulmonary embolism with evidence of right heart strain. She was started on Heparin inpatient and was bridged to Eliquis. She is currently on Eliquis 5mg BID. Patient denies any OWENS, cough, chest pain, SOB at rest, LE swelling at this time. \par \par B/L LE Duplex 2020\par  Impression:\par No evidence of deep venous thrombosis in the bilateral lower extremities.\par \par CT Angio Chest 2020 \par PULMONARY EMBOLUS: Right lower lobar pulmonary embolism extending into segmental branches. Evidence of right heart strain with RV:LV ratio of 1.0.\par IMPRESSION:\par Right lower lobar pulmonary embolism with evidence of right heart strain.\par \par This is her first VTE event. No OCP use. Never a smoker. She has three children all  deliveries. She has no personal or family history of clotting disorders. She did recently travel from [place] but stated she ambulated frequently during her travels and post op. She was diagnosed with COVID-19 in 2020; with complaints of loss of smell, fever, and weakness. Since PE developed after three months from diagnosis it is unlikely that it is related to the virus and its side effects. In addition, she was found to have a history of mild anemia. She is not currently on any iron supplements. She has heavy menses lasting about 5 days. She noted that after starting the eliquis she has developed a mild increase in bleeding and clotting. \par \par  [de-identified] : 9/15/2020\par Patient is here for a follow-up visit for hx of PE.  She is feeling well with no new complaints.  Patient denies fever, chills, worsening of fatigue/SOB, pica, lightheadedness or bleeding.  She does report her menstrual cycle is slightly heavier since being on anticoagulation.  She is anticipating followup with Dr. Vinita SAAVEDRA at the end of the month.  She has been tolerating Eliquis BiD for about 3 months now, since 06/2020.\par \par 12/28/20\par She is here for follow up. D dimer last visit was negative . She feels fine.  She just had a refil for her DAOC. We decided to finish this month. She contineus to have heavy menses so I asked to to start oral iron. She had CTA done in 11/2020 for chest pain:IMPRESSION:1.  Since June 18, 2020, no evidence of acute pulmonary embolus or other acute intrathoracic pathology; interval resolution of right lower lobe pulmonary emboli since 6/18/2020.\par \par 6/8/22\par Patient is here for a follow-up visit for hx of PE.  Since last visit, she presented to the ER back in 04/2022 after noticing her bilateral lower ext were swollen and she developed SOB with minimal exertion x 3 days.  She was found to have acute pulmonary emboli, noted in a subsegmental branch of the right lower lobe pulmonary artery.  She has been on Eliquis BiD since 04/2022.   Patient presently denies fever, chills, worsening of fatigue/SOB, pica, CP, dyspnea or bleeding.  She does report her menstrual cycle is slightly heavier since being on anticoagulation for 5 day duration (no menstrual clots noteD).  She is anticipating followup with Dr. Vinita SAAVEDRA soon as well.  \par CTa Chest (4.20.2022) IMPRESSION:No evidence of pulmonary embolism.Intraluminal filling defects, consistent with acute pulmonary emboli, are noted in a subsegmental branch of the right lower lobe pulmonary artery (3/57). No evidence of central pulmonary embolism.There is evidence of right heart strain (RV:LV ratio >1; RV=5.2 cm and LV=4.5 cm)

## 2022-06-13 NOTE — PHYSICAL EXAM
[Fully active, able to carry on all pre-disease performance without restriction] : Status 0 - Fully active, able to carry on all pre-disease performance without restriction [Normal] : normoactive bowel sounds, soft and nontender, no hepatosplenomegaly or masses appreciated [de-identified] : abdominal incision and breast incision- dressing reportedly C/D/I; she is wearing dress today

## 2022-06-13 NOTE — ASSESSMENT
[FreeTextEntry1] : 37 year old female with recent surgery in [place] on 6/3/2020 for breast reduction with abdominoplasty. She later developed abdominal abscess in wich CT revealed incidental RLL PE with RV strain. \par \par # Unprovoked PE - second PE episode in this pt (h/o PE after surgery in 2019 and treated with apixaban 1 year)\par - D-dimer previously negative but not drawn at second event\par - followup with PULM, Dr. Talamantes, as indicated\par - venous duplex negative for DVT\par - ECHO: no RV strain\par - c/w Eliquis 5mg BiD, she needs lifelong anticoagulation at this time due to recurrent, unprovoked event ; had a lengthy discussion regarding tolerability and explained we can consider decreasing to 2.5mg BiD after 6 months due to menorrhagia\par - will review imaging with RADIOLOGY to determine if a new event or previously visualized \par \par # Normocytic Anemia due to FRANK from menstrual blood loss\par - she is not currently taking oral iron \par - B12 and folate WNL from 04/2022 ; ferritin was slightly elevated but could be due to inflammation\par - Labwork today:  CBC, iron studies, ferritin, hgb electrophoresis \par \par RTC in 2 months

## 2022-06-14 LAB
FERRITIN SERPL-MCNC: 81 NG/ML
HGB A MFR BLD: 97.2 %
HGB A2 MFR BLD: 2.8 %
HGB FRACT BLD-IMP: NORMAL
IRON SATN MFR SERPL: 14 %
IRON SERPL-MCNC: 48 UG/DL
TIBC SERPL-MCNC: 345 UG/DL
UIBC SERPL-MCNC: 297 UG/DL

## 2022-07-06 ENCOUNTER — APPOINTMENT (OUTPATIENT)
Dept: ORTHOPEDIC SURGERY | Facility: CLINIC | Age: 37
End: 2022-07-06

## 2022-07-06 DIAGNOSIS — S46.011D STRAIN OF MUSCLE(S) AND TENDON(S) OF THE ROTATOR CUFF OF RIGHT SHOULDER, SUBSEQUENT ENCOUNTER: ICD-10-CM

## 2022-07-06 DIAGNOSIS — M25.511 PAIN IN RIGHT SHOULDER: ICD-10-CM

## 2022-07-06 DIAGNOSIS — S49.91XD UNSPECIFIED INJURY OF RIGHT SHOULDER AND UPPER ARM, SUBSEQUENT ENCOUNTER: ICD-10-CM

## 2022-07-06 PROCEDURE — 73030 X-RAY EXAM OF SHOULDER: CPT | Mod: RT

## 2022-07-06 PROCEDURE — 99213 OFFICE O/P EST LOW 20 MIN: CPT | Mod: 25

## 2022-07-06 PROCEDURE — 20610 DRAIN/INJ JOINT/BURSA W/O US: CPT | Mod: RT

## 2022-07-06 RX ORDER — DOXYCYCLINE HYCLATE 100 MG/1
100 TABLET ORAL
Qty: 14 | Refills: 0 | Status: DISCONTINUED | COMMUNITY
Start: 2021-02-08 | End: 2022-07-06

## 2022-07-06 RX ORDER — AMOXICILLIN AND CLAVULANATE POTASSIUM 875; 125 MG/1; 1/1
875-125 TABLET, FILM COATED ORAL
Refills: 0 | Status: DISCONTINUED | COMMUNITY
End: 2022-07-06

## 2022-07-06 RX ORDER — FERROUS GLUCONATE 324(37.5)
324 (37.5 FE) TABLET ORAL
Qty: 30 | Refills: 3 | Status: DISCONTINUED | COMMUNITY
Start: 2020-12-28 | End: 2022-07-06

## 2022-07-06 RX ORDER — SULFAMETHOXAZOLE AND TRIMETHOPRIM 800; 160 MG/1; MG/1
TABLET ORAL
Refills: 0 | Status: DISCONTINUED | COMMUNITY
End: 2022-07-06

## 2022-07-06 NOTE — PROCEDURE
[Large Joint Injection] : Large joint injection [Right] : of the right [Shoulder] : shoulder [Pain] : pain [Inflammation] : inflammation [Alcohol] : alcohol [___ cc    1%] : Lidocaine ~Vcc of 1%  [___ cc    4mg] : Dexamethasone (Decadron) ~Vcc of 4 mg  [Call if redness, pain or fever occur] : call if redness, pain or fever occur [Apply ice for 15min out of every hour for the next 12-24 hours as tolerated] : apply ice for 15 minutes out of every hour for the next 12-24 hours as tolerated [Patient was advised to rest the joint(s) for ____ days] : patient was advised to rest the joint(s) for [unfilled] days [Risks, benefits, alternatives discussed / Verbal consent obtained] : the risks benefits, and alternatives have been discussed, and verbal consent was obtained

## 2022-07-06 NOTE — PHYSICAL EXAM
[Normal Coordination] : normal coordination [Normal DTR UE/LE] : normal DTR UE/LE  [Normal Sensation] : normal sensation [Normal Mood and Affect] : normal mood and affect [Orientated] : orientated [Normal Skin] : normal skin [No Rash] : no rash [No Ulcers] : no ulcers [No Lesions] : no lesions [No obvious lymphadenopathy in areas examined] : no obvious lymphadenopathy in areas examined

## 2022-07-06 NOTE — REASON FOR VISIT
[FreeTextEntry2] : Patient is a 37-year-old female coming in today for follow-up on her right shoulder.  She has been actively under this office is care since April of 2022

## 2022-07-06 NOTE — ED PROVIDER NOTE - CADM POA CENTRAL LINE
DISPLAY PLAN FREE TEXT DISPLAY PLAN FREE TEXT DISPLAY PLAN FREE TEXT DISPLAY PLAN FREE TEXT DISPLAY PLAN FREE TEXT DISPLAY PLAN FREE TEXT No

## 2022-07-06 NOTE — ASSESSMENT
[FreeTextEntry1] :  patient was offered an verbally consented to a cortisone injection into the right shoulder.  She tolerated procedure well.  Her a script for physical therapy was renewed and recent to workman's comp for authorization.  She was given a note excusing her from work until her follow-up appointment on the 28th of July\par  due to the physical demands of her job she is currently 100% temporarily /total disabled and will remain out of work until her follow-up.  She expressed understanding of outlined care plan and had no additional questions or concerns at discharge

## 2022-07-06 NOTE — IMAGING
[de-identified] : Right Shoulder: Inspection of the shoulder/upper arm is as follows: no swelling, no erythema, no ecchymosis, no atrophy, no high-riding clavicle, no scapula winging, no deformity, no tomasa deformity and no inflammation. Palpation of the shoulder/upper arm is as follows: no tenderness to palpation. Range of motion of the shoulder is as follows:  Full range of motion of the shoulder with mild end range pain. Strength of the shoulder is as follows: Negative drop-arm, fairly good rotator cuff strength.  Ligament Stability and Special Tests of the shoulder is as follows: Shoulder apprehension shows to be positive. Impingement testing is positive. Positive De La Rosa Additional Ligament Stability and Special Tests of the shoulder is as follows: Zuleyma test is positive. Neurological testing of the shoulder is as follows: reflexes intact, No sensory deficits, motor and sensor intact distally and no scapular winging. \par \par Imaging Study Review: MRI of the Right Shoulder. Partial-thickness undersurface supraspinatus tendon insertional tear with a decent reactive marrow edema and subcentimeter cyst in the adjacent humeral head.\par Moderate diffuse rotator cuff tendinosis.\par Posterior inferior labral tear.\par Mild long head biceps tenosynovitis.

## 2022-07-06 NOTE — HISTORY OF PRESENT ILLNESS
[de-identified] :  patient is a 37-year-old female presenting today for repeat evaluation of a workman's comp injury.  She is a longstanding patient of this office since April of 2022.  She initially injured herself at work on the 26th February 2022 when a patient was taking a shower and had a seizure and she had to help the patient to the ground injuring her neck and shoulder.  She was last seen in our office on the 4th of June.  She was referred back to physical therapy but there was an issue with the authorization.  She went back to work few months ago but states that over the course of the last few weeks due to the increased pushing, pulling and lifting of her job she has had worsening of pain and increased disability.  Coming in today requesting re-evaluation for work restrictions as well as a shoulder cortisone injection.

## 2022-07-19 ENCOUNTER — FORM ENCOUNTER (OUTPATIENT)
Age: 37
End: 2022-07-19

## 2022-07-28 ENCOUNTER — APPOINTMENT (OUTPATIENT)
Dept: ORTHOPEDIC SURGERY | Facility: CLINIC | Age: 37
End: 2022-07-28

## 2022-08-04 ENCOUNTER — NON-APPOINTMENT (OUTPATIENT)
Age: 37
End: 2022-08-04

## 2022-08-05 ENCOUNTER — NON-APPOINTMENT (OUTPATIENT)
Age: 37
End: 2022-08-05

## 2022-09-20 ENCOUNTER — APPOINTMENT (OUTPATIENT)
Dept: ORTHOPEDIC SURGERY | Facility: CLINIC | Age: 37
End: 2022-09-20

## 2023-06-15 ENCOUNTER — RESULT CHARGE (OUTPATIENT)
Age: 38
End: 2023-06-15

## 2023-06-15 ENCOUNTER — NON-APPOINTMENT (OUTPATIENT)
Age: 38
End: 2023-06-15

## 2023-06-15 ENCOUNTER — APPOINTMENT (OUTPATIENT)
Dept: ORTHOPEDIC SURGERY | Facility: CLINIC | Age: 38
End: 2023-06-15
Payer: OTHER MISCELLANEOUS

## 2023-06-15 VITALS — HEIGHT: 62 IN | WEIGHT: 220 LBS | BODY MASS INDEX: 40.48 KG/M2

## 2023-06-15 DIAGNOSIS — S93.491A SPRAIN OF OTHER LIGAMENT OF RIGHT ANKLE, INITIAL ENCOUNTER: ICD-10-CM

## 2023-06-15 PROCEDURE — 73610 X-RAY EXAM OF ANKLE: CPT | Mod: RT

## 2023-06-15 PROCEDURE — 99213 OFFICE O/P EST LOW 20 MIN: CPT | Mod: ACP

## 2023-06-15 PROCEDURE — 73503 X-RAY EXAM HIP UNI 4/> VIEWS: CPT | Mod: RT

## 2023-06-15 NOTE — IMAGING
[de-identified] : Physical examination right ankle: Mild swelling appreciated laterally greater than medially.  No ecchymosis or erythema appreciated.  Skin is intact.  Patient tender to palpation over the ATFL.  No tenderness to the deltoid ligament.  No tenderness of the medial or lateral malleolus.  No tenderness of the metatarsals or phalanges.  No tenderness of the Lisfranc.  Stable to varus valgus stress.  DP pulse are palpable.  Sensorimotor intact distally.  Able to bear weight and ambulate however experiences pain when doing so.

## 2023-06-15 NOTE — HISTORY OF PRESENT ILLNESS
[de-identified] : 38-year-old female here for evaluation of right ankle and hip pain.  Patient reports this morning on 6/15/2023 she was walking down the steps when she missed a step causing her to fall directly on her right side.  She reports to have inverted her right ankle in the process of fall directly on her right hip.  She came directly here after the injury.  She has been able to bear weight and ambulate however experiences pain when doing so.

## 2023-06-15 NOTE — DATA REVIEWED
[FreeTextEntry1] : X-rays of the right hip and right ankle taken in the office today:  No acute fractures, subluxations, or dislocations.\par

## 2023-08-03 ENCOUNTER — NON-APPOINTMENT (OUTPATIENT)
Age: 38
End: 2023-08-03

## 2023-08-03 ENCOUNTER — APPOINTMENT (OUTPATIENT)
Dept: ORTHOPEDIC SURGERY | Facility: CLINIC | Age: 38
End: 2023-08-03
Payer: OTHER MISCELLANEOUS

## 2023-08-03 DIAGNOSIS — S70.01XA CONTUSION OF RIGHT HIP, INITIAL ENCOUNTER: ICD-10-CM

## 2023-08-03 PROCEDURE — 99213 OFFICE O/P EST LOW 20 MIN: CPT | Mod: ACP

## 2023-08-03 PROCEDURE — 73502 X-RAY EXAM HIP UNI 2-3 VIEWS: CPT

## 2023-08-03 NOTE — IMAGING
[de-identified] : On examination of her right hip she has some tenderness to palpation over the groin and the greater trochanter.  Negative logroll.  She has slightly decreased range of motion of the hip pain with flexion and internal rotation.  She is able to straight leg raise, pain with straight leg raise against resistance.  Sensation is intact throughout the lower extremities, there is no saddle anesthesia.  The calves are soft and nontender.  No tenderness to palpation of the right ankle.  She has full range of motion of the ankle, sensation is intact throughout, 2+ DP and PT pulses.  X-rays repeated in the office today of the right hip and pelvis are unchanged from her previous x-rays, there is no obvious fractures, dislocations, or other bony abnormalities.

## 2023-08-03 NOTE — DISCUSSION/SUMMARY
[de-identified] : At this time I recommend she do some physical therapy, I have given her prescription for this.  Warm compresses, anti-inflammatories as needed for pain.  We will see her back in 4 to 6 weeks for repeat evaluation after therapy to see if she is improving. Patient will call me if any other problems or concerns.  Patient verbalized understanding and agreed with the plan, all questions were answered in the office today.  This is a Worker's Compensation case, she is going to return to work on 8/11/2023.  Her degree of disability is mild.

## 2023-08-03 NOTE — HISTORY OF PRESENT ILLNESS
[de-identified] : 38-year-old female is here today for follow-up of her right hip and right ankle.  Patient had a fall about 6 weeks ago injuring her right hip and ankle.  She was seen in our walk-in and was advised she had a sprain of the ankle and a contusion of the hip.  She states her ankle is feeling better but she is still having pain in the hip.  She denies any new injury or trauma.  She denies any pain radiating down the leg, she denies any numbness tingling or any calf pain.

## 2023-09-12 ENCOUNTER — APPOINTMENT (OUTPATIENT)
Dept: ORTHOPEDIC SURGERY | Facility: CLINIC | Age: 38
End: 2023-09-12

## 2023-11-28 ENCOUNTER — APPOINTMENT (OUTPATIENT)
Dept: CARDIOLOGY | Facility: CLINIC | Age: 38
End: 2023-11-28

## 2024-09-04 ENCOUNTER — APPOINTMENT (OUTPATIENT)
Dept: ORTHOPEDIC SURGERY | Facility: CLINIC | Age: 39
End: 2024-09-04
Payer: OTHER MISCELLANEOUS

## 2024-09-04 DIAGNOSIS — M54.12 RADICULOPATHY, CERVICAL REGION: ICD-10-CM

## 2024-09-04 PROCEDURE — 72050 X-RAY EXAM NECK SPINE 4/5VWS: CPT

## 2024-09-04 PROCEDURE — 99203 OFFICE O/P NEW LOW 30 MIN: CPT

## 2024-09-04 NOTE — IMAGING
[de-identified] : TTP midline cervical spine and paraspinal musculature   Strength                                                                     Deltoid   Right: 5/5; Left: 5/5                      Biceps   Right: 5/5; Left: 5/5                   Triceps        Right: 5/5; Left: 5/5                                 Wrist Extensors     Right: 5/5; Left: 5/5 Finger Flexors     Right: 5/5; Left: 5/5 IO    Right: 5/5; Left: 5/5  Sensation C5   Right: 2/2; Left: 2/2 C6   Right: 2/2; Left: 2/2 C7   Right: 2/2; Left: 2/2 C8   Right: 2/2; Left: 2/2 T1   Right: 2/2; Left: 2/2  Reflexes Biceps   Right: 2+; Left 2+ Triceps   Right: 2+; Left 2+ Maher's  Right: Negative; L: Negative

## 2024-09-04 NOTE — DATA REVIEWED
[FreeTextEntry1] : Obtained reviewed AP lateral extension cervical spine x-rays.  Instability no fractures or dislocations.

## 2024-09-04 NOTE — HISTORY OF PRESENT ILLNESS
[de-identified] : -year-old female presents to me with neck pain.  Radiates occasionally down her right arm into her right hand.  She has had this since a work injury in February 2022.  She is currently at work.  Loss of bladder or bowel.  No balance issues.  No hand clumsiness.

## 2024-09-04 NOTE — DISCUSSION/SUMMARY
[de-identified] : 39-year-old female presents to me with cervical radiculopathy after work-related accident as well as some neck pain.  I am giving her prescription for physical therapy she will follow-up with pain management discuss injections if she fails physical therapy.  She is currently working.

## 2025-02-14 NOTE — DISCUSSION/SUMMARY
[de-identified] : Treatment plan is discussed:\par \par I recommended anti-inflammatory medication. Patient agrees to taking Advil/Ibuprofen OTC as needed for pain. Benefits discussed. Confirmed no contraindication to NSAIDs.\par \par I recommended patient rest, ice, compress, and elevate the ankle regularly. Encouraged activity modification as tolerable. Encouraged gentle range of motion to avoid stiffness. no gym /sports until follow-up evaluation.\par \par The patient was placed in a right tall Cam walker boot.  Patient instructed to wear the boot at all times past when active and ambulating.  Patient may remove the boot when showering/sleeping.\par \par Patient understands that the first 2 weeks are the worst in regard to pain and swelling. Patient understands that residual pain and swelling may last for up to 6 months-1 year.\par \par All questions and concerns addressed to patient's satisfaction. Patient expresses full understanding of treatment plan.\par Patient will follow up in 3-4 weeks with Rose for further evaluation treatment.\par 
No indicators present

## 2025-04-30 ENCOUNTER — APPOINTMENT (OUTPATIENT)
Dept: PULMONOLOGY | Facility: CLINIC | Age: 40
End: 2025-04-30
Payer: COMMERCIAL

## 2025-04-30 VITALS
WEIGHT: 233 LBS | HEART RATE: 62 BPM | HEIGHT: 63 IN | DIASTOLIC BLOOD PRESSURE: 62 MMHG | OXYGEN SATURATION: 98 % | BODY MASS INDEX: 41.29 KG/M2 | SYSTOLIC BLOOD PRESSURE: 132 MMHG

## 2025-04-30 DIAGNOSIS — I26.99 OTHER PULMONARY EMBOLISM W/OUT ACUTE COR PULMONALE: ICD-10-CM

## 2025-04-30 PROCEDURE — 99205 OFFICE O/P NEW HI 60 MIN: CPT

## 2025-04-30 PROCEDURE — G2211 COMPLEX E/M VISIT ADD ON: CPT

## 2025-05-08 ENCOUNTER — EMERGENCY (EMERGENCY)
Facility: HOSPITAL | Age: 40
LOS: 0 days | Discharge: HOME | End: 2025-05-09
Attending: EMERGENCY MEDICINE
Payer: COMMERCIAL

## 2025-05-08 VITALS
RESPIRATION RATE: 16 BRPM | TEMPERATURE: 99 F | HEIGHT: 63 IN | WEIGHT: 229.94 LBS | OXYGEN SATURATION: 100 % | SYSTOLIC BLOOD PRESSURE: 120 MMHG | DIASTOLIC BLOOD PRESSURE: 78 MMHG | HEART RATE: 68 BPM

## 2025-05-08 DIAGNOSIS — R06.02 SHORTNESS OF BREATH: ICD-10-CM

## 2025-05-08 DIAGNOSIS — G89.29 OTHER CHRONIC PAIN: ICD-10-CM

## 2025-05-08 DIAGNOSIS — M54.9 DORSALGIA, UNSPECIFIED: ICD-10-CM

## 2025-05-08 DIAGNOSIS — R20.2 PARESTHESIA OF SKIN: ICD-10-CM

## 2025-05-08 DIAGNOSIS — R07.2 PRECORDIAL PAIN: ICD-10-CM

## 2025-05-08 DIAGNOSIS — Z98.891 HISTORY OF UTERINE SCAR FROM PREVIOUS SURGERY: Chronic | ICD-10-CM

## 2025-05-08 LAB
ALBUMIN SERPL ELPH-MCNC: 4.1 G/DL — SIGNIFICANT CHANGE UP (ref 3.5–5.2)
ALP SERPL-CCNC: 58 U/L — SIGNIFICANT CHANGE UP (ref 30–115)
ALT FLD-CCNC: 12 U/L — SIGNIFICANT CHANGE UP (ref 0–41)
ANION GAP SERPL CALC-SCNC: 9 MMOL/L — SIGNIFICANT CHANGE UP (ref 7–14)
AST SERPL-CCNC: 16 U/L — SIGNIFICANT CHANGE UP (ref 0–41)
BILIRUB SERPL-MCNC: <0.2 MG/DL — SIGNIFICANT CHANGE UP (ref 0.2–1.2)
BUN SERPL-MCNC: 17 MG/DL — SIGNIFICANT CHANGE UP (ref 10–20)
CALCIUM SERPL-MCNC: 8.8 MG/DL — SIGNIFICANT CHANGE UP (ref 8.4–10.5)
CHLORIDE SERPL-SCNC: 104 MMOL/L — SIGNIFICANT CHANGE UP (ref 98–110)
CO2 SERPL-SCNC: 23 MMOL/L — SIGNIFICANT CHANGE UP (ref 17–32)
CREAT SERPL-MCNC: 0.8 MG/DL — SIGNIFICANT CHANGE UP (ref 0.7–1.5)
D DIMER BLD IA.RAPID-MCNC: 224 NG/ML DDU — SIGNIFICANT CHANGE UP
EGFR: 95 ML/MIN/1.73M2 — SIGNIFICANT CHANGE UP
EGFR: 95 ML/MIN/1.73M2 — SIGNIFICANT CHANGE UP
GLUCOSE SERPL-MCNC: 91 MG/DL — SIGNIFICANT CHANGE UP (ref 70–99)
HCG SERPL QL: NEGATIVE — SIGNIFICANT CHANGE UP
HCT VFR BLD CALC: 33.8 % — LOW (ref 37–47)
HGB BLD-MCNC: 11.2 G/DL — LOW (ref 12–16)
MCHC RBC-ENTMCNC: 32 PG — HIGH (ref 27–31)
MCHC RBC-ENTMCNC: 33.1 G/DL — SIGNIFICANT CHANGE UP (ref 32–37)
MCV RBC AUTO: 96.6 FL — SIGNIFICANT CHANGE UP (ref 81–99)
NT-PROBNP SERPL-SCNC: <36 PG/ML — SIGNIFICANT CHANGE UP (ref 0–300)
PLATELET # BLD AUTO: 182 K/UL — SIGNIFICANT CHANGE UP (ref 130–400)
PMV BLD: 11 FL — HIGH (ref 7.4–10.4)
POTASSIUM SERPL-MCNC: 4.2 MMOL/L — SIGNIFICANT CHANGE UP (ref 3.5–5)
POTASSIUM SERPL-SCNC: 4.2 MMOL/L — SIGNIFICANT CHANGE UP (ref 3.5–5)
PROT SERPL-MCNC: 6.4 G/DL — SIGNIFICANT CHANGE UP (ref 6–8)
RBC # BLD: 3.5 M/UL — LOW (ref 4.2–5.4)
RBC # FLD: 13.2 % — SIGNIFICANT CHANGE UP (ref 11.5–14.5)
SODIUM SERPL-SCNC: 136 MMOL/L — SIGNIFICANT CHANGE UP (ref 135–146)
TROPONIN T, HIGH SENSITIVITY RESULT: <6 NG/L — SIGNIFICANT CHANGE UP (ref 6–13)
WBC # BLD: 6.3 K/UL — SIGNIFICANT CHANGE UP (ref 4.8–10.8)
WBC # FLD AUTO: 6.3 K/UL — SIGNIFICANT CHANGE UP (ref 4.8–10.8)

## 2025-05-08 PROCEDURE — 80053 COMPREHEN METABOLIC PANEL: CPT

## 2025-05-08 PROCEDURE — 99285 EMERGENCY DEPT VISIT HI MDM: CPT

## 2025-05-08 PROCEDURE — 85379 FIBRIN DEGRADATION QUANT: CPT

## 2025-05-08 PROCEDURE — 99285 EMERGENCY DEPT VISIT HI MDM: CPT | Mod: 25

## 2025-05-08 PROCEDURE — 36415 COLL VENOUS BLD VENIPUNCTURE: CPT

## 2025-05-08 PROCEDURE — 71045 X-RAY EXAM CHEST 1 VIEW: CPT

## 2025-05-08 PROCEDURE — 93010 ELECTROCARDIOGRAM REPORT: CPT

## 2025-05-08 PROCEDURE — 84484 ASSAY OF TROPONIN QUANT: CPT

## 2025-05-08 PROCEDURE — 75574 CT ANGIO HRT W/3D IMAGE: CPT

## 2025-05-08 PROCEDURE — 85025 COMPLETE CBC W/AUTO DIFF WBC: CPT

## 2025-05-08 PROCEDURE — G0378: CPT

## 2025-05-08 PROCEDURE — 71045 X-RAY EXAM CHEST 1 VIEW: CPT | Mod: 26

## 2025-05-08 PROCEDURE — 83880 ASSAY OF NATRIURETIC PEPTIDE: CPT

## 2025-05-08 PROCEDURE — 93005 ELECTROCARDIOGRAM TRACING: CPT

## 2025-05-08 PROCEDURE — 84703 CHORIONIC GONADOTROPIN ASSAY: CPT

## 2025-05-08 NOTE — ED ADULT NURSE NOTE - NSFALLUNIVINTERV_ED_ALL_ED
Bed/Stretcher in lowest position, wheels locked, appropriate side rails in place/Call bell, personal items and telephone in reach/Instruct patient to call for assistance before getting out of bed/chair/stretcher/Non-slip footwear applied when patient is off stretcher/[place] to call system/Physically safe environment - no spills, clutter or unnecessary equipment/Purposeful proactive rounding/Room/bathroom lighting operational, light cord in reach

## 2025-05-08 NOTE — ED ADULT NURSE NOTE - NSICDXPASTSURGICALHX_GEN_ALL_CORE_FT
Scribe Attestation (For Scribes USE Only)... PAST SURGICAL HISTORY:  H/O:       Attending Attestation (For Attendings USE Only).../Scribe Attestation (For Scribes USE Only)...

## 2025-05-08 NOTE — ED PROVIDER NOTE - CLINICAL SUMMARY MEDICAL DECISION MAKING FREE TEXT BOX
Throughout ED observation period, pt remained clinically and hemodynamically stable.  Chest pain workup included evaluation for ACS   Serial troponins w/o significant delta.  Uriel ROMAN- ED Attending, interpreted the EKG- sinus rhythm,  no acute ischaemic changes, no high degree blocks    Pt assessed for noncardiac causes of chest pain  Uriel ROMAN- ED Attending, interpreted the chest x-ray - no opacities, free air or ptx  dimer negative     Given age and risk factors, will place in EDOU for ACS rule out

## 2025-05-08 NOTE — ED PROVIDER NOTE - PHYSICAL EXAMINATION
Constitutional: Well developed, well nourished. NAD  Head: Normocephalic, atraumatic.  Eyes: PERRL, EOMI.  ENT: No nasal discharge. Mucous membranes dry.  Neck: Supple. Painless ROM.  Cardiovascular:  Regular rate and rhythm.  + mild chest wall tenderness;   Pulmonary:  Lungs clear to auscultation bilaterally.    Abdominal: Soft. Nondistended. No rebound, guarding, rigidity.  Extremities. Pelvis stable. No lower extremity edema, symmetric calves.  Skin: No rashes, cyanosis.  Neuro: AAOx3. No focal neurological deficits.  Psych: Normal mood. Normal affect.

## 2025-05-08 NOTE — ED ADULT NURSE NOTE - OBJECTIVE STATEMENT
Aox4 pt c.o chest pain along with L leg numbness. IV placed, labs sent and placed on cardiac monitor

## 2025-05-08 NOTE — ED PROVIDER NOTE - OBJECTIVE STATEMENT
40 yold female to ED [facility] PE provoked(s/p breast reduction sx) was no anticoag briefly(2022); pt presents to ed c/o mild mid sternal chest pain sharp brief episodes lasting few seconds with sob; sx unlike prior episode of PE; pt also had stress test 1 yr ago nl; pt with mild left lateral leg paresthesia x 3 days; pt also hx chronic back pain;

## 2025-05-08 NOTE — ED PROVIDER NOTE - ATTENDING APP SHARED VISIT CONTRIBUTION OF CARE
40-year-old female history of provoked PE in 2022, not currently on anticoagulation  Patient for evaluation of intermittent mild midsternal chest pain.  Pain described as sharp brief and without specific provoking or exacerbating factors.  Patient does endorse some shortness of breath when she has this pain.  No consistent pleuritic pain or calf pain.  Last stress test was about a year ago.  Patient is also complaining of intermittent left lateral paresthesia but no numbness to her legs, normal strength to legs, normal gait, no trauma.  No calf pain or swelling    vss  gen- NAD, aaox3  card-rrr  lungs-ctab, no wheezing or rhonchi  abd-sntnd, no guarding or rebound  neuro- full str/sensation, cn ii-xii grossly intact, normal coordination   LE- no calf pain/swelling/tenderness b/l

## 2025-05-09 VITALS
OXYGEN SATURATION: 100 % | RESPIRATION RATE: 18 BRPM | HEART RATE: 61 BPM | TEMPERATURE: 98 F | DIASTOLIC BLOOD PRESSURE: 59 MMHG | SYSTOLIC BLOOD PRESSURE: 93 MMHG

## 2025-05-09 LAB
ANISOCYTOSIS BLD QL: SLIGHT — SIGNIFICANT CHANGE UP
BASOPHILS # BLD AUTO: 0 K/UL — SIGNIFICANT CHANGE UP (ref 0–0.2)
BASOPHILS NFR BLD AUTO: 0 % — SIGNIFICANT CHANGE UP (ref 0–1)
EOSINOPHIL # BLD AUTO: 0.22 K/UL — SIGNIFICANT CHANGE UP (ref 0–0.7)
EOSINOPHIL NFR BLD AUTO: 3.5 % — SIGNIFICANT CHANGE UP (ref 0–8)
GIANT PLATELETS BLD QL SMEAR: PRESENT — SIGNIFICANT CHANGE UP
LYMPHOCYTES # BLD AUTO: 2.21 K/UL — SIGNIFICANT CHANGE UP (ref 1.2–3.4)
LYMPHOCYTES # BLD AUTO: 35.1 % — SIGNIFICANT CHANGE UP (ref 20.5–51.1)
MACROCYTES BLD QL: SLIGHT — SIGNIFICANT CHANGE UP
MANUAL SMEAR VERIFICATION: SIGNIFICANT CHANGE UP
MONOCYTES # BLD AUTO: 0.5 K/UL — SIGNIFICANT CHANGE UP (ref 0.1–0.6)
MONOCYTES NFR BLD AUTO: 7.9 % — SIGNIFICANT CHANGE UP (ref 1.7–9.3)
NEUTROPHILS # BLD AUTO: 3.37 K/UL — SIGNIFICANT CHANGE UP (ref 1.4–6.5)
NEUTROPHILS NFR BLD AUTO: 53.5 % — SIGNIFICANT CHANGE UP (ref 42.2–75.2)
PLAT MORPH BLD: NORMAL — SIGNIFICANT CHANGE UP
RBC BLD AUTO: ABNORMAL
TROPONIN T, HIGH SENSITIVITY RESULT: <6 NG/L — SIGNIFICANT CHANGE UP (ref 6–13)

## 2025-05-09 PROCEDURE — 99236 HOSP IP/OBS SAME DATE HI 85: CPT

## 2025-05-09 PROCEDURE — 75574 CT ANGIO HRT W/3D IMAGE: CPT | Mod: 26

## 2025-05-09 RX ORDER — METOPROLOL SUCCINATE 50 MG/1
50 TABLET, EXTENDED RELEASE ORAL ONCE
Refills: 0 | Status: COMPLETED | OUTPATIENT
Start: 2025-05-09 | End: 2025-05-09

## 2025-05-09 RX ADMIN — METOPROLOL SUCCINATE 50 MILLIGRAM(S): 50 TABLET, EXTENDED RELEASE ORAL at 01:03

## 2025-05-09 RX ADMIN — Medication 1000 MILLILITER(S): at 05:49

## 2025-05-09 NOTE — ED CDU PROVIDER DISPOSITION NOTE - PATIENT PORTAL LINK FT
You can access the FollowMyHealth Patient Portal offered by [facility] by registering at the following website: [email or web address]. By joining [facility]’s FollowMyHealth portal, you will also be able to view your health information using other applications (apps) compatible with our system.

## 2025-05-09 NOTE — ED CDU PROVIDER DISPOSITION NOTE - CLINICAL COURSE
Pt presented to the ED with complaint of chest pain. Pt had cardiac workup and placed in OBS for CCTA. Pt CCTA with CAD RADS 0. Pt states improvement in her pain. VSS. Plan to d/c f/u with pcp. Strict ED return precautions given.

## 2025-05-09 NOTE — ED CDU PROVIDER DISPOSITION NOTE - NSFOLLOWUPINSTRUCTIONS_ED_ALL_ED_FT
Chest Pain    AMBULATORY CARE:    Chest pain can be caused by a range of conditions, from not serious to life-threatening. It is important to follow up with your healthcare provider to find the cause of your chest pain.    Common symptoms you may have with chest pain:    Fever or sweating    Nausea or vomiting    Shortness of breath    Discomfort or pressure that spreads from your chest to your back, jaw, or arm    A racing or slow heartbeat    Feeling weak, tired, or faint  Call your local emergency number (911 in the [place]) or have someone call if:    You have any of the following signs of a heart attack:  Squeezing, pressure, or pain in your chest    You may also have any of the following:  Discomfort or pain in your back, neck, jaw, stomach, or arm    Shortness of breath    Nausea or vomiting    Lightheadedness or a sudden cold sweat    Seek care immediately if:    You have chest discomfort that gets worse, even with medicine.    You cough or vomit blood.    Your bowel movements are black or bloody.    You cannot stop vomiting, or it hurts to swallow.  Call your doctor if:    You have questions or concerns about your condition or care.    Treatment for chest pain may include medicine to treat your symptoms while your healthcare provider finds the cause of your chest pain.    Medicines may be given to treat the cause of your chest pain. Examples include pain medicine, anxiety medicine, or medicines to increase blood flow to your heart.    Do not take certain medicines without asking your healthcare provider first. These include NSAIDs, herbal or vitamin supplements, and hormones, such as estrogen or progestin.    One or more stents may need to be placed in your heart if pain was caused by blockage. A stent is a wire mesh tube that helps hold your artery open.  Healthy living tips: If the cause of your chest pain is known, your healthcare provider will give you specific guidelines to follow. The following are general healthy guidelines:    Do not smoke. Nicotine and other chemicals in cigarettes and cigars can cause lung and heart damage. Ask your provider for information if you currently smoke and need help to quit. E-cigarettes or smokeless tobacco still contain nicotine. Talk to your provider before you use these products.    Choose a variety of healthy foods as often as possible. Include fresh, frozen, or canned fruits and vegetables. Also include low-fat dairy products, fish, chicken (without skin), and lean meats. Your provider or a dietitian can help you create meal plans. You may need to avoid certain foods or drinks if your pain is caused by a digestion problem.  Healthy Foods      Lower your sodium (salt) intake. Limit foods that are high in sodium, such as canned foods, salty snacks, and cold cuts. If you add salt when you cook food, do not add more at the table. Choose low-sodium canned foods as much as possible.        Drink plenty of water every day. Water helps your body to control your temperature and blood pressure. Ask your provider how much water you should drink every day.    Ask about activity. Your provider will tell you which activities to limit or avoid. Ask when you can drive, return to work, and have sex. Ask about the best exercise plan for you.    Maintain a healthy weight. Ask your provider what a healthy weight is for you. Ask your provider to help you create a safe weight loss plan if you are overweight.    Ask about vaccines you may need. Your provider can tell you if you also need vaccines not listed below, and when to get them:  Ask your healthcare provider about the flu and pneumonia vaccines. All adults should get the flu (influenza) vaccine as soon as recommended each year, usually in September or October. The pneumonia vaccine is recommended for all adults aged 50 or older to prevent pneumococcal disease, such as pneumonia. Adults aged 19 to 49 years who are at high risk for pneumococcal disease should also receive the vaccine. You may need 1 dose or 2. The number depends on the vaccine used and your risk factors.    COVID-19 vaccines are given to adults as a shot. At least 1 dose of an updated vaccine is recommended for all adults. COVID-19 vaccines are updated throughout the year. Adults 65 or older need a second dose of updated vaccine at least 4 months after the first dose. Your healthcare provider can help you schedule all needed doses as updated vaccines become available.  Prevent Heart Disease   Follow up with your doctor within 72 hours, or as directed: You may need to return for more tests to find the cause of your chest pain. You may be referred to a specialist, such as a cardiologist or gastroenterologist. Write down your questions so you remember to ask them during your visits.

## 2025-06-09 ENCOUNTER — APPOINTMENT (OUTPATIENT)
Dept: PULMONOLOGY | Facility: CLINIC | Age: 40
End: 2025-06-09